# Patient Record
Sex: FEMALE | Race: WHITE | NOT HISPANIC OR LATINO | Employment: FULL TIME | URBAN - METROPOLITAN AREA
[De-identification: names, ages, dates, MRNs, and addresses within clinical notes are randomized per-mention and may not be internally consistent; named-entity substitution may affect disease eponyms.]

---

## 2017-01-09 ENCOUNTER — TELEPHONE (OUTPATIENT)
Dept: PHYSICAL MEDICINE AND REHAB | Facility: MEDICAL CENTER | Age: 52
End: 2017-01-09

## 2017-01-09 ENCOUNTER — OFFICE VISIT (OUTPATIENT)
Dept: PHYSICAL MEDICINE AND REHAB | Facility: MEDICAL CENTER | Age: 52
End: 2017-01-09
Payer: COMMERCIAL

## 2017-01-09 VITALS
BODY MASS INDEX: 24.59 KG/M2 | SYSTOLIC BLOOD PRESSURE: 122 MMHG | HEART RATE: 71 BPM | WEIGHT: 144 LBS | OXYGEN SATURATION: 95 % | TEMPERATURE: 99 F | HEIGHT: 64 IN | DIASTOLIC BLOOD PRESSURE: 68 MMHG

## 2017-01-09 DIAGNOSIS — M54.9 SPINAL PAIN: ICD-10-CM

## 2017-01-09 DIAGNOSIS — M51.35 DDD (DEGENERATIVE DISC DISEASE), THORACOLUMBAR: ICD-10-CM

## 2017-01-09 DIAGNOSIS — M54.9 MID BACK PAIN: ICD-10-CM

## 2017-01-09 DIAGNOSIS — M50.30 DDD (DEGENERATIVE DISC DISEASE), CERVICAL: ICD-10-CM

## 2017-01-09 DIAGNOSIS — M25.559 ARTHRALGIA OF HIP, UNSPECIFIED LATERALITY: ICD-10-CM

## 2017-01-09 DIAGNOSIS — M54.2 NECK PAIN: ICD-10-CM

## 2017-01-09 DIAGNOSIS — M54.12 CERVICAL RADICULITIS: ICD-10-CM

## 2017-01-09 DIAGNOSIS — M25.50 ARTHRALGIA, UNSPECIFIED JOINT: ICD-10-CM

## 2017-01-09 DIAGNOSIS — M54.50 CHRONIC MIDLINE LOW BACK PAIN WITHOUT SCIATICA: ICD-10-CM

## 2017-01-09 DIAGNOSIS — Z79.899 CONTROLLED SUBSTANCE AGREEMENT SIGNED: ICD-10-CM

## 2017-01-09 DIAGNOSIS — M79.18 MYOFASCIAL PAIN: ICD-10-CM

## 2017-01-09 DIAGNOSIS — G89.29 CHRONIC MIDLINE LOW BACK PAIN WITHOUT SCIATICA: ICD-10-CM

## 2017-01-09 DIAGNOSIS — M54.9 CHRONIC BILATERAL BACK PAIN, UNSPECIFIED BACK LOCATION: ICD-10-CM

## 2017-01-09 DIAGNOSIS — G89.29 CHRONIC BILATERAL BACK PAIN, UNSPECIFIED BACK LOCATION: ICD-10-CM

## 2017-01-09 DIAGNOSIS — M25.512 LEFT SHOULDER PAIN, UNSPECIFIED CHRONICITY: ICD-10-CM

## 2017-01-09 PROCEDURE — 20553 NJX 1/MLT TRIGGER POINTS 3/>: CPT | Performed by: PHYSICAL MEDICINE & REHABILITATION

## 2017-01-09 PROCEDURE — 99213 OFFICE O/P EST LOW 20 MIN: CPT | Mod: 25 | Performed by: PHYSICAL MEDICINE & REHABILITATION

## 2017-01-09 RX ORDER — OXYCODONE AND ACETAMINOPHEN 10; 325 MG/1; MG/1
TABLET ORAL
Qty: 90 TAB | Refills: 0 | Status: SHIPPED | OUTPATIENT
Start: 2017-01-09 | End: 2017-01-09 | Stop reason: SDUPTHER

## 2017-01-09 RX ORDER — OXYCODONE AND ACETAMINOPHEN 10; 325 MG/1; MG/1
TABLET ORAL
Qty: 90 TAB | Refills: 0 | Status: SHIPPED | OUTPATIENT
Start: 2017-01-09 | End: 2017-03-10 | Stop reason: SDUPTHER

## 2017-01-09 RX ORDER — RIFAXIMIN 550 MG/1
TABLET ORAL
COMMUNITY
Start: 2017-01-06 | End: 2020-02-13

## 2017-01-09 NOTE — MR AVS SNAPSHOT
"        Daylinlevon Taylor   2017 3:50 PM   Office Visit   MRN: [id number]    Department:  [facility]   Dept Phone:  [phone]    Description:  Female : 1965   Provider:  Eduardo High M.D.           Reason for Visit     Follow-Up           Allergies as of 2017     Allergen Noted Reactions    Codeine 2010   Itching, Vomiting    RXN=20 years go      You were diagnosed with     Controlled substance agreement signed   [771410]       Mid back pain   [506367]       Chronic midline low back pain without sciatica   [9446888]       Arthralgia, unspecified joint   [5661578]       DDD (degenerative disc disease), thoracolumbar   [256853]       Neck pain   [665994]       Cervical radiculitis   [106687]       DDD (degenerative disc disease), cervical   [599860]       Spinal pain   [149627]       Myofascial pain   [534556]         Vital Signs     Blood Pressure Pulse Temperature Height Weight Body Mass Index    122/68 mmHg 71 37.2 °C (99 °F) 1.626 m (5' 4.02\") 65.318 kg (144 lb) 24.71 kg/m2    Oxygen Saturation Last Menstrual Period Smoking Status             95% 2014 Never Smoker          Basic Information     Date Of Birth Sex Race Ethnicity Preferred Language    1965 Female White Non- English      Your appointments     Mar 10, 2017  1:30 PM   Follow Up Visit with Eduardo High M.D.   [facility] PHYSIATRY (--)    [address] [facility] [address]  [facility] [id number] [phone]           You will be receiving a confirmation call a few days before your appointment from our automated call confirmation system.              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      Below and/or attached are the medications your provider expects you to take. Review all of your home medications and newly ordered medications with your provider and/or pharmacist. Follow medication instructions as directed by your provider and/or pharmacist. Please keep your medication list with you and share with your provider. Update the information when medications are discontinued, doses are changed, or new medications (including over-the-counter products) are added; and carry medication information at all times in the event of emergency situations     Allergies:  CODEINE - Itching,Vomiting               Medications  Valid as of: January 09, 2017 -  5:00 PM    Generic Name Brand Name Tablet Size Instructions for use    Acyclovir (Tab) ZOVIRAX 400 MG         Albuterol Sulfate (Aero Soln) PROVENTIL  (90 BASE) MCG/ACT         Butalbital-APAP-Caffeine (Cap) FIORICET -40 MG         Diclofenac Sodium (Gel) Diclofenac Sodium 1 % APPLY GEL TOPICALLY 3-4 TIMES DAILY RO LEFT FOOT FOR PAIN        DiphenhydrAMINE HCl (Tab) BENADRYL 25 MG Take 25 mg by mouth 2 times a day as needed for Sleep.        Estradiol (Solution) EVAMIST 1.53 MG/SPRAY Apply 1 Spray to affected area(s) every day.        Fluocinonide (Cream) LIDEX 0.05 %         Fluticasone Propionate (Suspension) FLONASE 50 MCG/ACT Spray 1 Spray in nose every day.        Ibuprofen (Tab) MOTRIN 200 MG Take 200 mg by mouth 2 times a day as needed.        Levocetirizine Dihydrochloride (Tab) Levocetirizine Dihydrochloride 5 MG          Lidocaine (Patch) LIDODERM 5 % Apply 1 Patch to skin as directed every 24 hours. as needed for nerve pain        Naproxen (Tab) NAPROSYN 250 MG Take 250 mg by mouth 2 times a day, with meals.        Oxycodone-Acetaminophen (Tab) PERCOCET-10  MG Take 1/2 to 1 tablet by mouth every 8 hours as needed for pain.        Progesterone Micronized (Cap) PROMETRIUM 200 MG Take 200 mg by mouth every day.        RifAXIMin (Tab) XIFAXAN 550 MG         Sertraline HCl (Tab) ZOLOFT 100 MG Take 50 mg by mouth every day.        TraZODone HCl (Tab) DESYREL 50 MG Take 1/2 to 1 tablet by mouth at bedtime as needed for insomnia        .                 Medicines prescribed today were sent to:     [address] [address]    [address] [address]    Phone: [phone] Fax: [phone]    Open 24 Hours?: No      Medication refill instructions:       If your prescription bottle indicates you have medication refills left, it is not necessary to call your provider’s office. Please contact your pharmacy and they will refill your medication.    If your prescription bottle indicates you do not have any refills left, you may request refills at any time through one of the following ways: The online [employer] system (except Urgent Care), by calling your provider’s office, or by asking your pharmacy to contact your provider’s office with a refill request. Medication refills are processed only during regular business hours and may not be available until the next business day. Your provider may request additional information or to have a follow-up visit with you prior to refilling your medication.   *Please Note: Medication refills are assigned a new Rx number when refilled electronically. Your pharmacy may indicate that no refills were authorized even though a new prescription for the same medication is available at the pharmacy. Please request the medicine by name with the pharmacy  before contacting your provider for a refill.        Your To Do List     Future Labs/Procedures Complete By Expires    COMP METABOLIC PANEL  As directed 1/9/2018    MR-CERVICAL SPINE-W/O  As directed 1/9/2018         [employer] Access Code: [email or web address]  Expires: 1/30/2017  8:12 AM    [employer]  A secure, online tool to manage your health information     5 exampless [employer]® is a secure, online tool that connects you to your personalized health information from the privacy of your home -- day or night - making it very easy for you to manage your healthcare. Once the activation process is completed, you can even access your medical information using the [employer] alberto, which is available for free in the Apple Alberto store or Google Play store.     [employer] provides the following levels of access (as shown below):   My Chart Features   [facility]  Specialists [facility]  Urgent  Care Non-[facility]  Primary Care  Doctor   Email your healthcare team securely and privately 24/7 X X X    Manage appointments: schedule your next appointment; view details of past/upcoming appointments X      Request prescription refills. X      View recent personal medical records, including lab and immunizations X X X X   View health record, including health history, allergies, medications X X X X   Read reports about your outpatient visits, procedures, consult and ER notes X X X X   See your discharge summary, which is a recap of your hospital and/or ER visit that includes your diagnosis, lab results, and care plan. X X       How to register for [employer]:  1. Go to  [email or web address].  2. Click on the Sign Up Now box, which takes you to the New Member Sign Up page. You will need to provide the following information:  a. Enter your [employer] Access Code exactly as it appears at the top of this page. (You will not need to use this code after you’ve completed the sign-up process. If you do not sign up before the expiration  date, you must request a new code.)   b. Enter your date of birth.   c. Enter your home email address.   d. Click Submit, and follow the next screen’s instructions.  3. Create a ImmuRx ID. This will be your [employer] login ID and cannot be changed, so think of one that is secure and easy to remember.  4. Create a [employer]let password. You can change your password at any time.  5. Enter your Password Reset Question and Answer. This can be used at a later time if you forget your password.   6. Enter your e-mail address. This allows you to receive e-mail notifications when new information is available in ImmuRx.  7. Click Sign Up. You can now view your health information.    For assistance activating your ImmuRx account, call [phone]

## 2017-01-10 NOTE — PROGRESS NOTES
SUBJECTIVE:   Ms. Taylor returns to the office today for followup evaluation of spinal/joint/musculoskeletal pain.    The patient notes benefit with the trigger point injections at the last visit, unfortunately had flare pain, possibly activity associated.    She notes ongoing pain in the cervical region, primarily left-sided, now with radiating pain to the left upper limb, with neuropathic component.    She notes intermittent left shoulder area pain.    The patient notes mid back pain, primarily in the mid-lower thoracic region, also intermittent posterior chest wall radiation, neuropathic component, relatively controlled. She has had benefit with prior injection therapy.    She notes low back pain,  relatively controlled, without overt radicular component. She has had benefit with prior injection therapy.    She notes left hip/posterior pelvic area pain, relatively controlled.    She notes history of left foot/ankle area pain, seen by podiatrist.    The patient notes right small finger is resolved, had right small finger laceration outside the area 8/2016, returned to [facility], and right small finger radial digital nerve repair 8/17/2016, reviewed operative report      The patient has prior bilateral carpal tunnel release.    She notes intermittent headaches, relatively controlled.    She notes some difficulty with sleep.    She has had prior treatment including medications. She has been to physical therapy, currently in [facility]. She has tried acupuncture, chiropractics, and massage therapy, transiently palliative.   She has had prior injections, with benefit. She denies bowel/bladder dysfunction. She denies overt limb weakness. She denies cardiopulmonary symptomatology. She is making an effort with her home exercise program. The flare of pain is limiting her ability to function.      MEDICAL RECORDS REVIEW/DATA/REVIEW OF SYSTEMS:   Reviewed in epic.    I  reviewed medications. When used, the pain/symptomatic medications have been somewhat helpful for controlling her pain, maintaining mood, and allowing her to function, including ADLs. Notes dry mouth with tizanidine, otherwise side effects are controlled.  No aberrant behavior noted. Did not tolerate gabapentin. Note prior trial, not effective or tolerated: prozac, baclofen, robaxin, xanax.    I reviewed  profile 1/2017, consistent. Reviewed controlled substance agreement 3/2016.      I reviewed diagnostic studies.     Reviewed radiographs. Cervical spine x-rays 1/2014 showed mild degenerative changes, no instability.  Left shoulder x-rays 12/2016 showed  ac arthritis. MRI thoracic spine 10/2016 showed multilevel disc protrusions and degenerative changes, no intrinsic cord changes.  Reviewed MRI lumbar spine 4/2013, showed multilevel degenerative changes, disc protrusion and foraminal stenosis greatest at left L5-S1. Lumbar spine x-rays 4/2013 show degenerative changes, no instability. Left hip x-rays 2/2013 were unremarkable.  Reviewed chest CT 4/2016. Reviewed chest x-ray 3/2016.  Head CT 5/2014 was unremarkable. Reviewed pelvic ultrasound 1/2008.     Reviewed laboratory studies. Reviewed CMP and TFTs 1/2016. Reviewed CBC, BMP, UA 2/2016. A1C 3/2015 of 5.8.  Reviewed urine drug screen for 10/2016, consistent.    I reviewed medical issues. Followed by primary care provider. Dermatology consulted regarding skin lesions.  Followed by ENT, notes sinus disease. Followed by GI, including regarding IBS. The patient notes she is perimenopausal, care per gynecology.      Review of systems: No fevers or chills noted. No cranial nerve symptoms are denoted. See history, otherwise review of systems unremarkable.     Family history: No changes noted. No neuromuscular disorder is noted.    I reviewed social issues. Notes pending travel outside the area. 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resort, during  summer.      PAST MEDICAL HISTORY:   Past Medical History   Diagnosis Date   • Skin lesions    • Anxiety    • Depression    • Hypertension      with pain   • Asthma      once with an allergic reaction   • Headache    • Back pain        PAST SURGICAL HISTORY:    Past Surgical History   Procedure Laterality Date   • Knee reconstruction       Right ACL   • Carpal tunnel release       left   • Carpal tunnel release       right   • Nerve repair Right 8/17/2016     Procedure: NERVE REPAIR RADIAL DIGITAL SMALL FINGER;  Surgeon: Simone Mendoza M.D.;  Location: SURGERY [facility];  Service:        ALLERGIES:  Codeine    MEDICATIONS:    Outpatient Encounter Prescriptions as of 1/9/2017   Medication Sig Dispense Refill   • XIFAXAN 550 MG Tab tablet      • oxycodone-acetaminophen (PERCOCET-10)  MG Tab Take 1/2 to 1 tablet by mouth every 8 hours as needed for pain. 90 Tab 0   • [DISCONTINUED] oxycodone-acetaminophen (PERCOCET-10)  MG Tab Take 1/2 to 1 tablet by mouth every 8 hours as needed for pain. 90 Tab 0   • acyclovir (ZOVIRAX) 400 MG tablet      • Diclofenac Sodium 1 % Gel APPLY GEL TOPICALLY 3-4 TIMES DAILY RO LEFT FOOT FOR PAIN  0   • naproxen (NAPROSYN) 250 MG Tab Take 250 mg by mouth 2 times a day, with meals.     • [DISCONTINUED] Oxycodone-Acetaminophen (PERCOCET-10)  MG Tab Take 1/2 to 1 tablet by mouth every 8 hours as needed for pain. 90 Tab 0   • PROVENTIL  (90 BASE) MCG/ACT Aero Soln inhalation aerosol      • acetaminophen/caffeine/butalbital 300-40-50 mg (FIORICET) -40 MG Cap capsule      • fluocinonide (LIDEX) 0.05 % Cream      • Levocetirizine Dihydrochloride 5 MG Tab      • trazodone (DESYREL) 50 MG Tab Take 1/2 to 1 tablet by mouth at bedtime as needed for insomnia 30 Tab 2   • diphenhydrAMINE (BENADRYL) 25 MG Tab Take 25 mg by mouth 2 times a day as needed for Sleep.     • fluticasone (FLONASE) 50 MCG/ACT nasal spray Spray 1 Spray in nose every day.     • lidocaine  (LIDODERM) 5 % Patch Apply 1 Patch to skin as directed every 24 hours. as needed for nerve pain 30 Patch 5   • sertraline (ZOLOFT) 100 MG Tab Take 50 mg by mouth every day.     • EVAMIST 1.53 MG/SPRAY SOLN Apply 1 Spray to affected area(s) every day.     • progesterone (PROMETRIUM) 200 MG capsule Take 200 mg by mouth every day.     • ibuprofen (MOTRIN) 200 MG TABS Take 200 mg by mouth 2 times a day as needed.       No facility-administered encounter medications on file as of 1/9/2017.       SOCIAL HISTORY:    Social History     Social History   • Marital Status:      Spouse Name: N/A   • Number of Children: N/A   • Years of Education: N/A     Social History Main Topics   • Smoking status: Never Smoker    • Smokeless tobacco: Never Used   • Alcohol Use: No      Comment: prior occasional alcohol use, denies current use.   • Drug Use: No   • Sexual Activity: Not Asked     Other Topics Concern   • None     Social History Narrative     The patient denies substance use/abuse.     The patient denies psychiatric history                      OBJECTIVE:   Vital signs: reviewed  Constitutional:  awake alert, no acute distress  HEENT: Normocephalic atraumatic, neck supple, no JVD, no meningeal signs  Lymphatic: No cervical, supraclavicular, or inguinal lymphadenopathy noted  Pulmonary: No tachypnea noted. No accessory muscle use noted, no dyspnea noted  Abdomen: Soft, nontender, nondistended, no HSM, no peritoneal signs, no CVA tenderness  Musculoskeletal:    Inspection: no deformity noted, healed scars consistent with prior surgeries  Palpation: Tender with palpation left mid and lower cervical region, left interscapular region, mild tenderness with palpation about left shoulder Only mild tender with palpation in lumbosacral region, minimal tender left hip/posterior pelvic region, only mild tenderness with palpation in thoracic region,   Range of Motion: Decreased cervical spine range of motion, pain with testing, mild  pain with left shoulder range of motion testing, Only mild Decreased lumbar spine range of motion, only mild mild pain at the extremes of left hip range of motion testing, only mild decreased thoracic spine range of motion, only mild pain with cervical spine testing, minimal pain with shoulder testing   Strength/Tone: 5/5 in upper and lower limbs  Neurologic:               Oriented ×3              Cranial nerves grossly intact   Reflexes: 1-2+ in upper and lower limbs, no upper motor neuron signs evident   Nerve Tests: Spurlings testing produces axial pain on the left, straight leg testing negative, negative Tinel's in upper limbs   Coordination: gait steady, reciprocal   Sensation: grossly intact in upper and lower limbs  Cardiovascular: Distal pulses 2+, including at wrist and ankles, no limb swelling noted  Skin: no rashes or lesions noted    Procedure note: Written/informed consent was obtained, risks benefits and alternatives discussed, and all questions were answered. The patient was placed prone on the exam table and 3 trigger points were identified, one each in the upper, mid, and lower cervical paraspinal muscles. The areas were sterilely prepared. Then using a 25-gauge 1-1/2 inch needle, trigger point injections were performed with injection of 1 cc of a mixture of 1 cc of Depo-Medrol 40 mg/cc and 2 cc of 1% lidocaine at each site. The patient tolerated the procedure well. There were no complications. The patient was released to a family member who served as        ASSESSMENT:  1. Flare of neck pain, myofascial pain, cervical radiculitis, degenerative disc disease, suspect stenosis   2. Left shoulder area pain, sprain strain, ac arthritis, consider rotator cuff disorder   3. Back pain, myofascial pain, history of lumbar radiculitis (controlled), controlled, lumbar disc protrusion, degenerative disc disease, facet syndrome   4. Left hip area/ischial tuberosity area pain, myofascial pain, hamstring  strain, relatively controlled  5. Status post right carpal tunnel release 8/2014, remote left carpal release  6. Left foot/ankle pain, sprain strain, with care per podiatry  7. Insomnia  8. Controlled substance agreement signed  9. Comorbid medical issues, including IBS, skin lesions, with care per primary care provider and consultants      DISCUSSION/PLAN:  1. I reviewed post procedure precautions.   2. I discussed management options. I reviewed symptomatic care.  3. Ordered MRI cervical spine without contrast to evaluate for nerve root compromise or stenosis contributing to ongoing pain  4. Ordered updated CMP, including kidney/liver function  5. Continue with physical therapy. I discussed efforts with home exercise program and activity modification. Right-hand activity/restrictions per orthopedics  6. The patient can consider trials with acupuncture, massage therapy, or TENS unit  7. I reviewed sleep hygiene  8. I reviewed headaches/migraine triggers.  9. I reviewed medication monitoring. I advise the patient that pain medication dosing is in the moderate range per guidelines, reviewed risks and alternatives. For now, continue current medications. I reviewed medication adjustments, including opioid taper, patient to consider. I wrote a new prescription for Percocet 10/325 1/2 to 1 tablet by mouth every 8 hours as needed for pain. #90, refill zero, the earliest date the pharmacy may fill the prescription is 1/11/2017, 2 prescriptions written for 2 month supply.  I counseled the patient regarding risks, side effects, and interactions of medications, including NSAIDs and over-the-counter medications. I reviewed tylenol/acetaminophen dosing. I reviewed serotonin syndrome risk. I reviewed bowel management program. I recommend avoid use of benzodiazepines due to the risk of interaction with pain medication. I advise the patient to avoid alcohol use. I counseled the patient on the controlled substance prescribing  program. I reviewed further symptomatic medications.  10. I reviewed additional diagnostic options, including further/advanced imaging, electrodiagnostic testing, vascular studies, and further lab screen  11. I reviewed additional therapeutic options, including further injection therapy and additional consultative input  12. Return in 2 month, after the MRI, or an as-needed basis.      Please note that this dictation was created using voice recognition software. I have made every reasonable attempt to correct obvious errors but there may be errors of grammar and content that I may have overlooked prior to finalization of this note.

## 2017-01-31 ENCOUNTER — APPOINTMENT (OUTPATIENT)
Dept: RADIOLOGY | Facility: MEDICAL CENTER | Age: 52
End: 2017-01-31
Attending: PHYSICAL MEDICINE & REHABILITATION
Payer: COMMERCIAL

## 2017-01-31 DIAGNOSIS — M54.2 NECK PAIN: ICD-10-CM

## 2017-01-31 DIAGNOSIS — M54.12 CERVICAL RADICULITIS: ICD-10-CM

## 2017-01-31 DIAGNOSIS — M50.30 DDD (DEGENERATIVE DISC DISEASE), CERVICAL: ICD-10-CM

## 2017-01-31 PROCEDURE — 72141 MRI NECK SPINE W/O DYE: CPT

## 2017-02-14 ENCOUNTER — TELEPHONE (OUTPATIENT)
Dept: PHYSICAL MEDICINE AND REHAB | Facility: MEDICAL CENTER | Age: 52
End: 2017-02-14

## 2017-02-14 NOTE — TELEPHONE ENCOUNTER
You can fax reports to PT.     The MRI cervical spine showed narrowing at one level where the nerve comes out of the spine. This can be treated with physical therapy, activity modifications, and injections if needed - overall good news.      I let Daylin know above.

## 2017-02-14 NOTE — TELEPHONE ENCOUNTER
Daylin left message she would like to know the results of her recent imaging she had done. She also would like us to fax reports to [facility]. She has an appt with Dr. High 3/10/17 but said she would like to know results if she could.

## 2017-02-23 DIAGNOSIS — G47.00 INSOMNIA, UNSPECIFIED TYPE: ICD-10-CM

## 2017-02-23 DIAGNOSIS — G47.09 OTHER INSOMNIA: ICD-10-CM

## 2017-02-23 RX ORDER — TRAZODONE HYDROCHLORIDE 50 MG/1
TABLET ORAL
Qty: 30 TAB | Refills: 5 | Status: SHIPPED | OUTPATIENT
Start: 2017-02-23 | End: 2019-02-25

## 2017-03-10 ENCOUNTER — HOSPITAL ENCOUNTER (OUTPATIENT)
Dept: LAB | Facility: MEDICAL CENTER | Age: 52
End: 2017-03-10
Attending: PHYSICAL MEDICINE & REHABILITATION
Payer: COMMERCIAL

## 2017-03-10 ENCOUNTER — OFFICE VISIT (OUTPATIENT)
Dept: PHYSICAL MEDICINE AND REHAB | Facility: MEDICAL CENTER | Age: 52
End: 2017-03-10
Payer: COMMERCIAL

## 2017-03-10 VITALS
BODY MASS INDEX: 23.73 KG/M2 | HEIGHT: 64 IN | OXYGEN SATURATION: 98 % | DIASTOLIC BLOOD PRESSURE: 68 MMHG | SYSTOLIC BLOOD PRESSURE: 130 MMHG | WEIGHT: 139 LBS | HEART RATE: 83 BPM | TEMPERATURE: 98.1 F

## 2017-03-10 DIAGNOSIS — M51.35 DDD (DEGENERATIVE DISC DISEASE), THORACOLUMBAR: ICD-10-CM

## 2017-03-10 DIAGNOSIS — M25.50 ARTHRALGIA, UNSPECIFIED JOINT: ICD-10-CM

## 2017-03-10 DIAGNOSIS — G89.29 CHRONIC MIDLINE LOW BACK PAIN WITHOUT SCIATICA: ICD-10-CM

## 2017-03-10 DIAGNOSIS — M54.5 CHRONIC BILATERAL LOW BACK PAIN, WITH SCIATICA PRESENCE UNSPECIFIED: ICD-10-CM

## 2017-03-10 DIAGNOSIS — M79.18 MYOFASCIAL PAIN: ICD-10-CM

## 2017-03-10 DIAGNOSIS — G89.29 CHRONIC BILATERAL LOW BACK PAIN, WITH SCIATICA PRESENCE UNSPECIFIED: ICD-10-CM

## 2017-03-10 DIAGNOSIS — M54.50 CHRONIC MIDLINE LOW BACK PAIN WITHOUT SCIATICA: ICD-10-CM

## 2017-03-10 DIAGNOSIS — Z79.899 CONTROLLED SUBSTANCE AGREEMENT SIGNED: ICD-10-CM

## 2017-03-10 DIAGNOSIS — M54.9 MID BACK PAIN: ICD-10-CM

## 2017-03-10 DIAGNOSIS — M50.30 DDD (DEGENERATIVE DISC DISEASE), CERVICAL: ICD-10-CM

## 2017-03-10 DIAGNOSIS — M54.9 SPINAL PAIN: ICD-10-CM

## 2017-03-10 LAB
ALBUMIN SERPL BCP-MCNC: 3.7 G/DL (ref 3.2–4.9)
ALBUMIN/GLOB SERPL: 1.3 G/DL
ALP SERPL-CCNC: 61 U/L (ref 30–99)
ALT SERPL-CCNC: 19 U/L (ref 2–50)
ANION GAP SERPL CALC-SCNC: 6 MMOL/L (ref 0–11.9)
AST SERPL-CCNC: 17 U/L (ref 12–45)
BILIRUB SERPL-MCNC: 0.8 MG/DL (ref 0.1–1.5)
BUN SERPL-MCNC: 9 MG/DL (ref 8–22)
CALCIUM SERPL-MCNC: 9.2 MG/DL (ref 8.4–10.2)
CHLORIDE SERPL-SCNC: 103 MMOL/L (ref 96–112)
CO2 SERPL-SCNC: 27 MMOL/L (ref 20–33)
CREAT SERPL-MCNC: 0.7 MG/DL (ref 0.5–1.4)
GFR SERPL CREATININE-BSD FRML MDRD: >60 ML/MIN/1.73 M 2
GLOBULIN SER CALC-MCNC: 2.8 G/DL (ref 1.9–3.5)
GLUCOSE SERPL-MCNC: 92 MG/DL (ref 65–99)
POTASSIUM SERPL-SCNC: 4 MMOL/L (ref 3.6–5.5)
PROT SERPL-MCNC: 6.5 G/DL (ref 6–8.2)
SODIUM SERPL-SCNC: 136 MMOL/L (ref 135–145)

## 2017-03-10 PROCEDURE — 36415 COLL VENOUS BLD VENIPUNCTURE: CPT

## 2017-03-10 PROCEDURE — 80053 COMPREHEN METABOLIC PANEL: CPT

## 2017-03-10 PROCEDURE — 99214 OFFICE O/P EST MOD 30 MIN: CPT | Performed by: PHYSICAL MEDICINE & REHABILITATION

## 2017-03-10 RX ORDER — OXYCODONE AND ACETAMINOPHEN 10; 325 MG/1; MG/1
TABLET ORAL
Qty: 90 TAB | Refills: 0 | Status: SHIPPED | OUTPATIENT
Start: 2017-03-10 | End: 2017-03-10 | Stop reason: SDUPTHER

## 2017-03-10 RX ORDER — MELOXICAM 15 MG/1
TABLET ORAL
COMMUNITY
Start: 2017-02-08 | End: 2017-09-05

## 2017-03-10 RX ORDER — OXYCODONE AND ACETAMINOPHEN 10; 325 MG/1; MG/1
TABLET ORAL
Qty: 90 TAB | Refills: 0 | Status: SHIPPED | OUTPATIENT
Start: 2017-03-10 | End: 2017-04-17 | Stop reason: SDUPTHER

## 2017-03-10 RX ORDER — METHYLPREDNISOLONE 4 MG/1
TABLET ORAL
COMMUNITY
Start: 2017-03-05 | End: 2017-04-17

## 2017-03-10 NOTE — MR AVS SNAPSHOT
"Daylin Taylor   3/10/2017 1:30 PM   Office Visit   MRN: [id number]    Department:  [facility]   Dept Phone:  [phone]    Description:  Female : 1965   Provider:  Eduardo High M.D.           Reason for Visit     Follow-Up           Allergies as of 3/10/2017     Allergen Noted Reactions    Codeine 2010   Itching, Vomiting    RXN=20 years go      You were diagnosed with     Controlled substance agreement signed   [742098]       Mid back pain   [180488]       Chronic midline low back pain without sciatica   [9161879]       Arthralgia, unspecified joint   [3150255]       DDD (degenerative disc disease), thoracolumbar   [855740]         Vital Signs     Blood Pressure Pulse Temperature Height Weight Body Mass Index    130/68 mmHg 83 36.7 °C (98.1 °F) 1.626 m (5' 4\") 63.05 kg (139 lb) 23.85 kg/m2    Oxygen Saturation Last Menstrual Period Smoking Status             98% 2014 Never Smoker          Basic Information     Date Of Birth Sex Race Ethnicity Preferred Language    1965 Female White Non- English      Your appointments     2017  1:00 PM   Follow Up Visit with Eduardo High M.D.   [facility] (--)    [address] [id number] [phone]           You will be receiving a confirmation call a few days before your appointment from our automated call confirmation system.              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      Below and/or attached are the medications your provider expects you to take. Review all of your home medications and newly ordered medications with your provider and/or pharmacist. Follow medication instructions as directed by your provider and/or pharmacist. Please keep your medication list with you and share with your provider. Update the information when medications are discontinued, doses are changed, or new medications (including over-the-counter products) are added; and carry medication information at all times in the event of emergency situations     Allergies:  CODEINE - Itching,Vomiting               Medications  Valid as of: March 10, 2017 -  2:12 PM    Generic Name Brand Name Tablet Size Instructions for use    Acyclovir (Tab) ZOVIRAX 400 MG         Albuterol Sulfate (Aero Soln) PROVENTIL  (90 BASE) MCG/ACT         Butalbital-APAP-Caffeine (Cap) FIORICET -40 MG         Diclofenac Sodium (Gel) Diclofenac Sodium 1 % APPLY GEL TOPICALLY 3-4 TIMES DAILY RO LEFT FOOT FOR PAIN        DiphenhydrAMINE HCl (Tab) BENADRYL 25 MG Take 25 mg by mouth 2 times a day as needed for Sleep.        Estradiol (Solution) EVAMIST 1.53 MG/SPRAY Apply 1 Spray to affected area(s) every day.        Fluocinonide (Cream) LIDEX 0.05 %         Fluticasone Propionate (Suspension) FLONASE 50 MCG/ACT Spray 1 Spray in nose every day.        Ibuprofen (Tab) MOTRIN 200 MG Take 200 mg by mouth 2 times a day as needed.        Levocetirizine Dihydrochloride (Tab) Levocetirizine Dihydrochloride 5 MG         Lidocaine (Patch) LIDODERM 5 % Apply 1 Patch to skin as directed every 24 hours. as needed for nerve pain        Meloxicam (Tab) MOBIC 15 MG         MethylPREDNISolone (Tablet  Therapy Pack) MEDROL DOSEPAK 4 MG         Naproxen (Tab) NAPROSYN 250 MG Take 250 mg by mouth 2 times a day, with meals.        Oxycodone-Acetaminophen (Tab) PERCOCET-10  MG Take 1/2  to 1 tablet by mouth every 8 hours as needed for pain.        Progesterone Micronized (Cap) PROMETRIUM 200 MG Take 200 mg by mouth every day.        RifAXIMin (Tab) XIFAXAN 550 MG         Sertraline HCl (Tab) ZOLOFT 100 MG Take 50 mg by mouth every day.        TraZODone HCl (Tab) DESYREL 50 MG Take 1/2  to 1 tablet by mouth at bedtime as needed for insomnia        .                 Medicines prescribed today were sent to:     [facility] [address] [place] [address] - [facility] [place] [address] [address]    Phone: [phone] Fax: [phone]    Open 24 Hours?: No      Medication refill instructions:       If your prescription bottle indicates you have medication refills left, it is not necessary to call your provider’s office. Please contact your pharmacy and they will refill your medication.    If your prescription bottle indicates you do not have any refills left, you may request refills at any time through one of the following ways: The online [employer] system (except Urgent Care), by calling your provider’s office, or by asking your pharmacy to contact your provider’s office with a refill request. Medication refills are processed only during regular business hours and may not be available until the next business day. Your provider may request additional information or to have a follow-up visit with you prior to refilling your medication.   *Please Note: Medication refills are assigned a new Rx number when refilled electronically. Your pharmacy may indicate that no refills were authorized even though a new prescription for the same medication is available at the pharmacy. Please request the medicine by name with the pharmacy before contacting your provider for a refill.        Your To Do List      Future Labs/Procedures Complete By Expires    PAIN MANAGEMENT SCRN, W/ RFLX TO QNT  As directed 3/10/2018    Comments:    Current Meds (name, sig, last dose):   Current outpatient prescriptions:   •  trazodone, Take 1/2  to 1 tablet by mouth at bedtime as needed for insomnia, prn  •  oxycodone-acetaminophen, Take 1/2 to 1 tablet by mouth every 8 hours as needed for pain., 3/10/2017  •  sertraline, 50 mg, Oral, DAILY, 3/10/2017 at am  •  EVAMIST, 1 [place], Topical, DAILY, 3/10/2017 at am  •  progesterone, 200 mg, Oral, DAILY, 3/10/2017 at ran out  •  meloxicam, , prn  •  MethylPREDNISolone, , prn  •  XIFAXAN, , 1/9/2017  •  acyclovir, , prn  •  Diclofenac Sodium, APPLY GEL TOPICALLY 3-4 TIMES DAILY RO LEFT FOOT FOR PAIN, prn  •  naproxen, 250 mg, Oral, BID WITH MEALS, prn  •  PROVENTIL HFA, , prn  •  acetaminophen/caffeine/butalbital 300-40-50 mg, , prn  •  fluocinonide, , prn  •  Levocetirizine Dihydrochloride, , prn  •  diphenhydrAMINE, 25 mg, Oral, BID PRN, prn  •  fluticasone, 1 Spray, Nasal, DAILY, prn at am  •  lidocaine, 1 Patch, Transdermal, Q24HRS, prn  •  ibuprofen, 200 mg, Oral, BID PRN, prn at Unknown time          PAIN MANAGEMENT SCRN, W/ RFLX TO QNT  As directed 3/10/2018    Comments:    Current Meds (name, sig, last dose):   Current outpatient prescriptions:   •  oxycodone-acetaminophen, Take 1/2  to 1 tablet by mouth every 8 hours as needed for pain.  •  trazodone, Take 1/2  to 1 tablet by mouth at bedtime as needed for insomnia, prn  •  sertraline, 50 mg, Oral, DAILY, 3/10/2017 at am  •  EVAMIST, 1 [place], Topical, DAILY, 3/10/2017 at am  •  progesterone, 200 mg, Oral, DAILY, 3/10/2017 at ran out  •  meloxicam, , prn  •  MethylPREDNISolone, , prn  •  XIFAXAN, , 1/9/2017  •  acyclovir, , prn  •  Diclofenac Sodium, APPLY GEL TOPICALLY 3-4 TIMES DAILY RO LEFT FOOT FOR PAIN, prn  •  naproxen, 250 mg, Oral, BID WITH MEALS, prn  •  PROVENTIL HFA, , prn  •  acetaminophen/caffeine/butalbital 300-40-50 mg, ,  prn  •  fluocinonide, , prn  •  Levocetirizine Dihydrochloride, , prn  •  diphenhydrAMINE, 25 mg, Oral, BID PRN, prn  •  fluticasone, 1 Spray, Nasal, DAILY, prn at am  •  lidocaine, 1 Patch, Transdermal, Q24HRS, prn  •  ibuprofen, 200 mg, Oral, BID PRN, prn at Unknown time               Insight Guru Access Code: [email or web address]  Expires: 4/8/2017  2:25 PM    Insight Guru  A secure, online tool to manage your health information     [employer]’s Insight Guru® is a secure, online tool that connects you to your personalized health information from the privacy of your home -- day or night - making it very easy for you to manage your healthcare. Once the activation process is completed, you can even access your medical information using the Insight Guru alberto, which is available for free in the Apple Alberto store or Google Play store.     Insight Guru provides the following levels of access (as shown below):   My Chart Features   [facility] Doctor [facility]  Specialists [facility]  Urgent  Care [facility]  Primary Care  Doctor   Email your healthcare team securely and privately 24/7 X X X    Manage appointments: schedule your next appointment; view details of past/upcoming appointments X      Request prescription refills. X      View recent personal medical records, including lab and immunizations X X X X   View health record, including health history, allergies, medications X X X X   Read reports about your outpatient visits, procedures, consult and ER notes X X X X   See your discharge summary, which is a recap of your hospital and/or ER visit that includes your diagnosis, lab results, and care plan. X X       How to register for Insight Guru:  1. Go to  [email or web address].  2. Click on the Sign Up Now box, which takes you to the New Member Sign Up page. You will need to provide the following information:  a. Enter your Insight Guru Access Code exactly as it appears at the top of this page. (You will not need to use this code after you’ve  completed the sign-up process. If you do not sign up before the expiration date, you must request a new code.)   b. Enter your date of birth.   c. Enter your home email address.   d. Click Submit, and follow the next screen’s instructions.  3. Create a [employer] ID. This will be your [employer] login ID and cannot be changed, so think of one that is secure and easy to remember.  4. Create a [employer] password. You can change your password at any time.  5. Enter your Password Reset Question and Answer. This can be used at a later time if you forget your password.   6. Enter your e-mail address. This allows you to receive e-mail notifications when new information is available in [employer]digits.  7. Click Sign Up. You can now view your health information.    For assistance activating your [employer] account, call [phone]

## 2017-03-10 NOTE — PROGRESS NOTES
SUBJECTIVE:   Ms. Taylor returns to the office today for followup evaluation of spinal/joint/musculoskeletal pain.    The patient notes benefit with the trigger point injections at the last visit, although she has had flare pain, possibly activity associated.    She has been working with physical therapy, notes some benefit.    She notes ongoing pain in the cervical region, primarily left-sided, constant, now with only intermittent radiating pain to the left upper limb, with neuropathic component. She also notes left occipital area headaches. She notes the left axial neck pain is most functionally limiting.     The patient notes flare of mid back pain, primarily in the mid-lower thoracic region, also intermittent posterior chest wall radiation, neuropathic component. She has had benefit with prior injection therapy.    She notes intermittent left shoulder area pain, controlled.    She notes low back pain,  relatively controlled, without overt radicular component. She has had benefit with prior injection therapy.    She notes left hip area pain, controlled.    She notes left foot/ankle area pain, seen by podiatrist, notes trial with injection and oral steroids, orthotics/footwear.    The patient notes right small finger is resolved, had right small finger laceration outside the area 8/2016, returned to [place], and right small finger radial digital nerve repair 8/17/2016, reviewed operative report      The patient has prior bilateral carpal tunnel release.    She notes intermittent headaches, relatively controlled.    She notes intermittent difficulty with sleep.    She has had prior treatment including medications. She has been to physical therapy, currently in retrial. She has tried acupuncture, chiropractics, and massage therapy, transiently palliative.   She has had prior injections, with benefit. She denies bowel/bladder dysfunction. She denies overt limb  weakness. She denies cardiopulmonary symptomatology. She is making an effort with her home exercise program. The ongoing pain limits her ability to function.      MEDICAL RECORDS REVIEW/DATA/REVIEW OF SYSTEMS:   Reviewed in epic.    I reviewed medications. The pain/symptomatic medications have been somewhat helpful for controlling her pain, maintaining mood, and allowing her to function, including ADLs. Notes dry mouth with tizanidine, otherwise side effects are controlled.  No aberrant behavior noted. Did not tolerate gabapentin. Note prior trial, not effective or tolerated: prozac, baclofen, robaxin, xanax.    I reviewed  profile 3/10/2017, consistent. Reviewed controlled substance agreement 3/2016.      I reviewed diagnostic studies.     Reviewed radiographs. Reviewed MRI cervical spine, images and report, see below. Cervical spine x-rays 1/2014 showed mild degenerative changes, no instability.  Left shoulder x-rays 12/2016 showed  ac arthritis. MRI thoracic spine 10/2016 showed multilevel disc protrusions and degenerative changes, no intrinsic cord changes.  Reviewed MRI lumbar spine 4/2013, showed multilevel degenerative changes, disc protrusion and foraminal stenosis greatest at left L5-S1. Lumbar spine x-rays 4/2013 show degenerative changes, no instability. Left hip x-rays 2/2013 were unremarkable.  Reviewed chest CT 4/2016. Reviewed chest x-ray 3/2016.  Head CT 5/2014 was unremarkable. Reviewed pelvic ultrasound 1/2008.     Reviewed laboratory studies. Reviewed CMP 3/2017. Reviewed TFTs 1/2016. Reviewed CBC, BMP, UA 2/2016. A1C 3/2015 of 5.8.  Reviewed urine drug screen for 10/2016, consistent.    I reviewed medical issues. Followed by primary care provider. Dermatology consulted regarding skin lesions.  Followed by ENT, notes sinus disease. Followed by GI, including regarding IBS. The patient notes she is perimenopausal, care per gynecology.      Review of systems: No fevers or chills noted. No cranial  nerve symptoms are denoted. See history, otherwise review of systems unremarkable.     Family history: No changes noted. No neuromuscular disorder is noted.    I reviewed social issues. 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      1/31/2017 12:58 PM    HISTORY/REASON FOR EXAM:  Neck and left arm pain.  Left hand tingling 2-3 years.    TECHNIQUE/EXAM DESCRIPTION:  MRI of the cervical spine without contrast.    The study was performed on a [employer] Signa 1.5 Melina MRI scanner.  T1 sagittal, T2 fast spin-echo sagittal, and gradient echo axial images were obtained of the cervical spine. T2 fat suppressed sagittal images were also obtained. Optional T2 axial images may also be included.    COMPARISON: None.    FINDINGS:  Alignment in the cervical spine is normal. Marrow signal in the vertebral bodies is normal. The prevertebral and paraspinous soft tissues are unremarkable. There are no anomalies at the craniovertebral junction.  The cervical spinal cord is normal in   caliber and signal throughout its course.    At C2-3, no abnormality.    At C3-4, no abnormality.    At C4-5, there is no significant disc bulge or protrusion. There is moderate left-sided hypertrophic facet arthropathy. There is moderate left foraminal stenosis. The right neural foramen remains widely patent. There is no central stenosis or lateral   recess stenosis.    At C5-6, there is a minimal disc-osteophyte complex. This barely contacts the ventral subarachnoid space. There is no central or foraminal stenosis.    At C6-C7, there is a minimal disc-osteophyte complex. There is no central stenosis. There is no significant foraminal stenosis.    At C7-T1, no abnormality.         Impression        1.  C4-5 moderate left-sided hypertrophic facet arthropathy. Moderate left foraminal stenosis. This could be the source of left upper extremity radiculopathy.  2.  C5-6 minimal disc-osteophyte complex. No central or foraminal  stenosis.  3.  C6-C7 minimal disc-osteophyte complex. No central or foraminal stenosis.  4.  No myelopathic cord signal abnormality at any cervical level.       PAST MEDICAL HISTORY:   Past Medical History   Diagnosis Date   • Skin lesions    • Anxiety    • Depression    • Hypertension      with pain   • Asthma      once with an allergic reaction   • Headache    • Back pain        PAST SURGICAL HISTORY:    Past Surgical History   Procedure Laterality Date   • Knee reconstruction       Right ACL   • Carpal tunnel release       left   • Carpal tunnel release       right   • Nerve repair Right 8/17/2016     Procedure: NERVE REPAIR RADIAL DIGITAL SMALL FINGER;  Surgeon: Simone Mendoza M.D.;  Location: SURGERY [facility];  Service:        ALLERGIES:  Codeine    MEDICATIONS:    Outpatient Encounter Prescriptions as of 3/10/2017   Medication Sig Dispense Refill   • oxycodone-acetaminophen (PERCOCET-10)  MG Tab Take 1/2  to 1 tablet by mouth every 8 hours as needed for pain. 90 Tab 0   • trazodone (DESYREL) 50 MG Tab Take 1/2  to 1 tablet by mouth at bedtime as needed for insomnia 30 Tab 5   • sertraline (ZOLOFT) 100 MG Tab Take 50 mg by mouth every day.     • EVAMIST 1.53 MG/SPRAY SOLN Apply 1 Spray to affected area(s) every day.     • progesterone (PROMETRIUM) 200 MG capsule Take 200 mg by mouth every day.     • meloxicam (MOBIC) 15 MG tablet      • MethylPREDNISolone (MEDROL DOSEPAK) 4 MG Tablet Therapy Pack      • [DISCONTINUED] oxycodone-acetaminophen (PERCOCET-10)  MG Tab Take 1/2  to 1 tablet by mouth every 8 hours as needed for pain. 90 Tab 0   • [DISCONTINUED] oxycodone-acetaminophen (PERCOCET-10)  MG Tab Take 1/2  to 1 tablet by mouth every 8 hours as needed for pain. 90 Tab 0   • XIFAXAN 550 MG Tab tablet      • [DISCONTINUED] oxycodone-acetaminophen (PERCOCET-10)  MG Tab Take 1/2 to 1 tablet by mouth every 8 hours as needed for pain. 90 Tab 0   • acyclovir (ZOVIRAX) 400 MG  tablet      • Diclofenac Sodium 1 % Gel APPLY GEL TOPICALLY 3-4 TIMES DAILY RO LEFT FOOT FOR PAIN  0   • naproxen (NAPROSYN) 250 MG Tab Take 250 mg by mouth 2 times a day, with meals.     • PROVENTIL  (90 BASE) MCG/ACT Aero Soln inhalation aerosol      • acetaminophen/caffeine/butalbital 300-40-50 mg (FIORICET) -40 MG Cap capsule      • fluocinonide (LIDEX) 0.05 % Cream      • Levocetirizine Dihydrochloride 5 MG Tab      • diphenhydrAMINE (BENADRYL) 25 MG Tab Take 25 mg by mouth 2 times a day as needed for Sleep.     • fluticasone (FLONASE) 50 MCG/ACT nasal spray Spray 1 Spray in nose every day.     • lidocaine (LIDODERM) 5 % Patch Apply 1 Patch to skin as directed every 24 hours. as needed for nerve pain 30 Patch 5   • ibuprofen (MOTRIN) 200 MG TABS Take 200 mg by mouth 2 times a day as needed.       No facility-administered encounter medications on file as of 3/10/2017.       SOCIAL HISTORY:    Social History     Social History   • Marital Status:      Spouse Name: N/A   • Number of Children: N/A   • Years of Education: N/A     Social History Main Topics   • Smoking status: Never Smoker    • Smokeless tobacco: Never Used   • Alcohol Use: No   • Drug Use: No   • Sexual Activity: Not Asked     Other Topics Concern   • None     Social History Narrative     The patient denies substance use/abuse.     The patient denies psychiatric history                      OBJECTIVE:   Vital signs: reviewed  Constitutional:  awake alert, no acute distress  HEENT: Normocephalic atraumatic, neck supple, no JVD, no meningeal signs  Lymphatic: No cervical, supraclavicular, or inguinal lymphadenopathy noted  Pulmonary: No tachypnea noted. No accessory muscle use noted, no dyspnea noted  Abdomen: Soft, nontender, nondistended, no HSM, no peritoneal signs, no CVA tenderness  Musculoskeletal:    Inspection: no deformity noted, healed scars consistent with prior surgeries  Palpation: Tender with palpation left mid and  lower cervical region, tender left occipital region, only mild tenderness with palpation about left shoulder, tender with palpation mid thoracic region, trigger points noted, Only mild tender with palpation in lumbosacral region, minimal tender left hip/posterior pelvic region   Range of Motion: Decreased cervical spine range of motion, pain with extension to the left, reproducing pain, only mild pain with left shoulder range of motion testing, pain with thoracic spine range of motion testing, only mild decreased lumbar spine range of motion, only mild mild pain at the extremes of left hip range of motion testing   Strength/Tone: 5/5 in upper and lower limbs  Neurologic:               Oriented ×3              Cranial nerves grossly intact   Reflexes: 1-2+ in upper and lower limbs, no upper motor neuron signs evident   Nerve Tests: Spurlings testing produces axial pain on the left, straight leg testing negative, negative Tinel's in upper limbs   Coordination: gait steady, reciprocal   Sensation: grossly intact in upper and lower limbs  Cardiovascular: Distal pulses 2+, including at wrist and ankles, no limb swelling noted  Skin: no rashes or lesions noted       ASSESSMENT:  1. Neck pain, myofascial pain, cervical radiculitis (controlled), degenerative disc disease, foraminal stenosis, suspect cervical facet syndrome  2. Midback pain, myofascial pain, disc protrusion, degenerative disc disease, facet syndrome   3. Low back pain, myofascial pain, history of lumbar radiculitis (controlled), controlled, lumbar disc protrusion, degenerative disc disease, facet syndrome, relatively symptomatically controlled  4. Left shoulder area pain, sprain strain, ac arthritis, consider rotator cuff disorder, relatively controlled  5. Left hip area/ischial tuberosity area pain, myofascial pain, hamstring strain, controlled  6. Status post right carpal tunnel release 8/2014, remote left carpal release  7. Left foot/ankle pain, sprain  strain, with care per podiatry  8. Insomnia  9. Controlled substance agreement signed  10. Comorbid medical issues, including IBS, skin lesions, with care per primary care provider and consultants      DISCUSSION/PLAN:  1. I discussed management options. I reviewed symptomatic care.  2. Check results on urine drug screen, provided today  3. Reviewed injection therapy with left cervical 4, 5, 6, and 7 medial branch blocks, facet blocks, as diagnostic/therapeutic intervention for suspected facetogenic pain, as well as thoracic trigger point injections for treatment of the myofascial component to the ongoing pain, perform in procedure center  4. Continue with physical therapy. I discussed efforts with home exercise program and activity modification. Right-hand activity/restrictions per orthopedics  5. The patient can consider trials with acupuncture, massage therapy, or TENS unit  6. I reviewed sleep hygiene  7. I reviewed headaches/migraine triggers.  8. I reviewed medication monitoring. I advise the patient that pain medication dosing is in the moderate range per guidelines, reviewed risks and alternatives. For now, continue current medications. I reviewed medication adjustments, including opioid taper, patient to consider. I wrote a new prescription for Percocet 10/325 1/2 to 1 tablet by mouth every 8 hours as needed for pain. #90, refill zero, the earliest date the pharmacy may fill the prescription is 3/15/2017, 2 prescriptions written for 2 month supply.  I counseled the patient regarding risks, side effects, and interactions of medications, including NSAIDs and over-the-counter medications. I reviewed tylenol/acetaminophen dosing. I reviewed serotonin syndrome risk. I reviewed bowel management program. I recommend avoid use of benzodiazepines due to the risk of interaction with pain medication. I advise the patient to avoid alcohol use. I counseled the patient on the controlled substance prescribing program. I  reviewed further symptomatic medications.  9. I reviewed additional diagnostic options, including further/advanced imaging, electrodiagnostic testing, vascular studies, and further lab screen  10. I reviewed additional therapeutic options, including further injection therapy and additional consultative input  11. Return in 6 weeks or an as-needed basis      Please note that this dictation was created using voice recognition software. I have made every reasonable attempt to correct obvious errors but there may be errors of grammar and content that I may have overlooked prior to finalization of this note.

## 2017-03-11 NOTE — PROGRESS NOTES
Special procedures H&P:    SUBJECTIVE:   Ms. Taylor  notes ongoing pain in the cervical region, primarily left-sided, constant, now with only intermittent radiating pain to the left upper limb, with neuropathic component. She also notes left occipital area headaches. She notes the left axial neck pain is most functionally limiting. The patient notes ongoing mid back pain, primarily in the mid-lower thoracic region, also intermittent posterior chest wall radiation, neuropathic component. She has had benefit with prior injection therapy. She has had prior conservative treatment trial.    MEDICAL RECORDS REVIEW/DATA/REVIEW OF SYSTEMS:   Reviewed in Psychiatric.    I reviewed medications.      I reviewed diagnostic studies.     Reviewed radiographs. Reviewed MRI cervical spine 1/2017. Cervical spine x-rays 1/2014 showed mild degenerative changes, no instability.  Left shoulder x-rays 12/2016 showed  ac arthritis. MRI thoracic spine 10/2016 showed multilevel disc protrusions and degenerative changes, no intrinsic cord changes.      Reviewed laboratory studies. Reviewed CMP 3/2017. Reviewed TFTs 1/2016. Reviewed CBC, BMP, UA 2/2016. A1C 3/2015 of 5.8.  Reviewed urine drug screen for 10/2016, consistent.    I reviewed medical issues.        Review of systems: No fevers or chills noted. No cranial nerve symptoms are denoted. See history, otherwise review of systems unremarkable.     Family history: No changes noted. No neuromuscular disorder is noted.    I reviewed social issues. 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        1/31/2017 12:58 PM    HISTORY/REASON FOR EXAM:  Neck and left arm pain.  Left hand tingling 2-3 years.    TECHNIQUE/EXAM DESCRIPTION:  MRI of the cervical spine without contrast.    The study was performed on a [employer] 1.5 Melina MRI scanner.  T1 sagittal, T2 fast spin-echo sagittal, and gradient echo axial images were obtained of the cervical spine. T2 fat  suppressed sagittal images were also obtained. Optional T2 axial images may also be included.    COMPARISON: None.    FINDINGS:  Alignment in the cervical spine is normal. Marrow signal in the vertebral bodies is normal. The prevertebral and paraspinous soft tissues are unremarkable. There are no anomalies at the craniovertebral junction.  The cervical spinal cord is normal in   caliber and signal throughout its course.    At C2-3, no abnormality.    At C3-4, no abnormality.    At C4-5, there is no significant disc bulge or protrusion. There is moderate left-sided hypertrophic facet arthropathy. There is moderate left foraminal stenosis. The right neural foramen remains widely patent. There is no central stenosis or lateral   recess stenosis.    At C5-6, there is a minimal disc-osteophyte complex. This barely contacts the ventral subarachnoid space. There is no central or foraminal stenosis.    At C6-C7, there is a minimal disc-osteophyte complex. There is no central stenosis. There is no significant foraminal stenosis.    At C7-T1, no abnormality.           Impression          1.  C4-5 moderate left-sided hypertrophic facet arthropathy. Moderate left foraminal stenosis. This could be the source of left upper extremity radiculopathy.  2.  C5-6 minimal disc-osteophyte complex. No central or foraminal stenosis.  3.  C6-C7 minimal disc-osteophyte complex. No central or foraminal stenosis.  4.  No myelopathic cord signal abnormality at any cervical level.        PAST MEDICAL HISTORY:   Past Medical History    Diagnosis  Date    •  Skin lesions      •  Anxiety      •  Depression      •  Hypertension          with pain    •  Asthma          once with an allergic reaction    •  Headache      •  Back pain          PAST SURGICAL HISTORY:    Past Surgical History    Procedure  Laterality  Date    •  Knee reconstruction            Right ACL    •  Carpal tunnel release            left    •  Carpal tunnel release             right    •  Nerve repair  Right  8/17/2016        Procedure: NERVE REPAIR RADIAL DIGITAL SMALL FINGER;  Surgeon: Simone Mendoza M.D.;  Location: SURGERY [facility] [place];  Service:         ALLERGIES:  Codeine    MEDICATIONS:     Encounter Medications     Outpatient Encounter Prescriptions as of 3/10/2017    Medication  Sig  Dispense  Refill    •  oxycodone-acetaminophen (PERCOCET-10)  MG Tab  Take 1/2  to 1 tablet by mouth every 8 hours as needed for pain.  90 Tab  0    •  trazodone (DESYREL) 50 MG Tab  Take 1/2  to 1 tablet by mouth at bedtime as needed for insomnia  30 Tab  5    •  sertraline (ZOLOFT) 100 MG Tab  Take 50 mg by mouth every day.        •  EVAMIST 1.53 MG/SPRAY SOLN  Apply 1 Spray to affected area(s) every day.        •  progesterone (PROMETRIUM) 200 MG capsule  Take 200 mg by mouth every day.        •  meloxicam (MOBIC) 15 MG tablet          •  MethylPREDNISolone (MEDROL DOSEPAK) 4 MG Tablet Therapy Pack          •  [DISCONTINUED] oxycodone-acetaminophen (PERCOCET-10)  MG Tab  Take 1/2  to 1 tablet by mouth every 8 hours as needed for pain.  90 Tab  0    •  [DISCONTINUED] oxycodone-acetaminophen (PERCOCET-10)  MG Tab  Take 1/2  to 1 tablet by mouth every 8 hours as needed for pain.  90 Tab  0    •  XIFAXAN 550 MG Tab tablet          •  [DISCONTINUED] oxycodone-acetaminophen (PERCOCET-10)  MG Tab  Take 1/2 to 1 tablet by mouth every 8 hours as needed for pain.  90 Tab  0    •  acyclovir (ZOVIRAX) 400 MG tablet          •  Diclofenac Sodium 1 % Gel  APPLY GEL TOPICALLY 3-4 TIMES DAILY RO LEFT FOOT FOR PAIN    0    •  naproxen (NAPROSYN) 250 MG Tab  Take 250 mg by mouth 2 times a day, with meals.        •  PROVENTIL  (90 BASE) MCG/ACT Aero Soln inhalation aerosol          •  acetaminophen/caffeine/butalbital 300-40-50 mg (FIORICET) -40 MG Cap capsule          •  fluocinonide (LIDEX) 0.05 % Cream          •  Levocetirizine Dihydrochloride 5 MG Tab          •   diphenhydrAMINE (BENADRYL) 25 MG Tab  Take 25 mg by mouth 2 times a day as needed for Sleep.        •  fluticasone (FLONASE) 50 MCG/ACT nasal spray  Spray 1 Spray in nose every day.        •  lidocaine (LIDODERM) 5 % Patch  Apply 1 Patch to skin as directed every 24 hours. as needed for nerve pain  30 Patch  5    •  ibuprofen (MOTRIN) 200 MG TABS  Take 200 mg by mouth 2 times a day as needed.            No facility-administered encounter medications on file as of 3/10/2017.           SOCIAL HISTORY:     Social History     Social History        Social History    •  Marital Status:          Spouse Name:  N/A    •  Number of Children:  N/A    •  Years of Education:  N/A        Social History Main Topics    •  Smoking status:  Never Smoker     •  Smokeless tobacco:  Never Used    •  Alcohol Use:  No    •  Drug Use:  No    •  Sexual Activity:  Not Asked        Other Topics  Concern    •  None        Social History Narrative         The patient denies substance use/abuse.     The patient denies psychiatric history                       OBJECTIVE:   Vital signs: reviewed  Constitutional:  awake alert, no acute distress  HEENT: Normocephalic atraumatic, neck supple, no JVD, no meningeal signs  Lymphatic: No cervical, supraclavicular, or inguinal lymphadenopathy noted  Pulmonary: No tachypnea noted. No accessory muscle use noted, no dyspnea noted  Abdomen: Soft, nontender, nondistended, no HSM, no peritoneal signs, no CVA tenderness  Musculoskeletal:                Inspection: no deformity noted, healed scars consistent with prior surgeries  Palpation: Tender with palpation left mid and lower cervical region, tender left occipital region, only mild tenderness with palpation about left shoulder, tender with palpation mid thoracic region, trigger points noted, Only mild tender with palpation in lumbosacral region, minimal tender left hip/posterior pelvic region    Range of Motion: Decreased cervical spine range of  motion, pain with extension to the left, reproducing pain, only mild pain with left shoulder range of motion testing, pain with thoracic spine range of motion testing, only mild decreased lumbar spine range of motion, only mild mild pain at the extremes of left hip range of motion testing              Strength/Tone: 5/5 in upper and lower limbs  Neurologic:               Oriented ×3              Cranial nerves grossly intact              Reflexes: 1-2+ in upper and lower limbs, no upper motor neuron signs evident              Nerve Tests: Spurlings testing produces axial pain on the left, straight leg testing negative, negative Tinel's in upper limbs              Coordination: gait steady, reciprocal              Sensation: grossly intact in upper and lower limbs  Cardiovascular: Distal pulses 2+, including at wrist and ankles, no limb swelling noted  Skin: no rashes or lesions noted          Assessment:    1. Cervical degenerative disc disease  2. Myofascial pain, thoracic spine    Plan:    1. Left cervical 4, 5, 6, and 7 medial branch blocks, facet blocks  2. Trigger point injections, thoracic spine

## 2017-03-20 DIAGNOSIS — M79.18 MYOFASCIAL PAIN: ICD-10-CM

## 2017-03-20 DIAGNOSIS — M50.30 DDD (DEGENERATIVE DISC DISEASE), CERVICAL: ICD-10-CM

## 2017-03-20 NOTE — PROGRESS NOTES
Special procedures H&P:      SUBJECTIVE:   Ms. Taylor  notes ongoing pain in the cervical region, primarily left-sided, constant, now with only intermittent radiating pain to the left upper limb, with neuropathic component. She also notes left occipital area headaches. She notes the left axial neck pain is most functionally limiting. The patient notes ongoing mid back pain, primarily in the mid-lower thoracic region, also intermittent posterior chest wall radiation, neuropathic component. She has had benefit with prior injection therapy. She has had prior conservative treatment trial.    MEDICAL RECORDS REVIEW/DATA/REVIEW OF SYSTEMS:   Reviewed in [facility].    I reviewed medications.      I reviewed diagnostic studies.     Reviewed radiographs. Reviewed MRI cervical spine 1/2017. Cervical spine x-rays 1/2014 showed mild degenerative changes, no instability.  Left shoulder x-rays 12/2016 showed  ac arthritis. MRI thoracic spine 10/2016 showed multilevel disc protrusions and degenerative changes, no intrinsic cord changes.      Reviewed laboratory studies. Reviewed CMP 3/2017. Reviewed TFTs 1/2016. Reviewed CBC, BMP, UA 2/2016. A1C 3/2015 of 5.8.  Reviewed urine drug screen for 10/2016, consistent.    I reviewed medical issues.         Review of systems: No fevers or chills noted. No cranial nerve symptoms are denoted. See history, otherwise review of systems unremarkable.     Family history: No changes noted. No neuromuscular disorder is noted.    I reviewed social issues. 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        1/31/2017 12:58 PM    HISTORY/REASON FOR EXAM:  Neck and left arm pain.  Left hand tingling 2-3 years.    TECHNIQUE/EXAM DESCRIPTION:  MRI of the cervical spine without contrast.    The study was performed on a [employer] Signa 1.5 Melina MRI scanner.  T1 sagittal, T2 fast spin-echo sagittal, and gradient echo axial images were obtained of the cervical spine. T2 fat  suppressed sagittal images were also obtained. Optional T2 axial images may also be included.    COMPARISON: None.    FINDINGS:  Alignment in the cervical spine is normal. Marrow signal in the vertebral bodies is normal. The prevertebral and paraspinous soft tissues are unremarkable. There are no anomalies at the craniovertebral junction.  The cervical spinal cord is normal in   caliber and signal throughout its course.    At C2-3, no abnormality.    At C3-4, no abnormality.    At C4-5, there is no significant disc bulge or protrusion. There is moderate left-sided hypertrophic facet arthropathy. There is moderate left foraminal stenosis. The right neural foramen remains widely patent. There is no central stenosis or lateral   recess stenosis.    At C5-6, there is a minimal disc-osteophyte complex. This barely contacts the ventral subarachnoid space. There is no central or foraminal stenosis.    At C6-C7, there is a minimal disc-osteophyte complex. There is no central stenosis. There is no significant foraminal stenosis.    At C7-T1, no abnormality.             Impression            1.  C4-5 moderate left-sided hypertrophic facet arthropathy. Moderate left foraminal stenosis. This could be the source of left upper extremity radiculopathy.  2.  C5-6 minimal disc-osteophyte complex. No central or foraminal stenosis.  3.  C6-C7 minimal disc-osteophyte complex. No central or foraminal stenosis.  4.  No myelopathic cord signal abnormality at any cervical level.         PAST MEDICAL HISTORY:   Past Medical History     Diagnosis   Date     •   Skin lesions        •   Anxiety        •   Depression        •   Hypertension              with pain     •   Asthma              once with an allergic reaction     •   Headache        •   Back pain            PAST SURGICAL HISTORY:    Past Surgical History     Procedure   Laterality   Date     •   Knee reconstruction                 Right ACL     •   Carpal tunnel release                  left     •   Carpal tunnel release                 right     •   Nerve repair   Right   8/17/2016           Procedure: NERVE REPAIR RADIAL DIGITAL SMALL FINGER;  Surgeon: Simone Mendoza M.D.;  Location: SURGERY [facility];  Service:          ALLERGIES:  Codeine    MEDICATIONS:      Encounter Medications      Outpatient Encounter Prescriptions as of 3/10/2017     Medication   Sig   Dispense   Refill     •   oxycodone-acetaminophen (PERCOCET-10)  MG Tab   Take 1/2  to 1 tablet by mouth every 8 hours as needed for pain.   90 Tab   0     •   trazodone (DESYREL) 50 MG Tab   Take 1/2  to 1 tablet by mouth at bedtime as needed for insomnia   30 Tab   5     •   sertraline (ZOLOFT) 100 MG Tab   Take 50 mg by mouth every day.           •   EVAMIST 1.53 MG/SPRAY SOLN   Apply 1 Spray to affected area(s) every day.           •   progesterone (PROMETRIUM) 200 MG capsule   Take 200 mg by mouth every day.           •   meloxicam (MOBIC) 15 MG tablet              •   MethylPREDNISolone (MEDROL DOSEPAK) 4 MG Tablet Therapy Pack              •   [DISCONTINUED] oxycodone-acetaminophen (PERCOCET-10)  MG Tab   Take 1/2  to 1 tablet by mouth every 8 hours as needed for pain.   90 Tab   0     •   [DISCONTINUED] oxycodone-acetaminophen (PERCOCET-10)  MG Tab   Take 1/2  to 1 tablet by mouth every 8 hours as needed for pain.   90 Tab   0     •   XIFAXAN 550 MG Tab tablet              •   [DISCONTINUED] oxycodone-acetaminophen (PERCOCET-10)  MG Tab   Take 1/2 to 1 tablet by mouth every 8 hours as needed for pain.   90 Tab   0     •   acyclovir (ZOVIRAX) 400 MG tablet              •   Diclofenac Sodium 1 % Gel   APPLY GEL TOPICALLY 3-4 TIMES DAILY RO LEFT FOOT FOR PAIN      0     •   naproxen (NAPROSYN) 250 MG Tab   Take 250 mg by mouth 2 times a day, with meals.           •   PROVENTIL  (90 BASE) MCG/ACT Aero Soln inhalation aerosol              •   acetaminophen/caffeine/butalbital 300-40-50 mg  (FIORICET) -40 MG Cap capsule              •   fluocinonide (LIDEX) 0.05 % Cream              •   Levocetirizine Dihydrochloride 5 MG Tab              •   diphenhydrAMINE (BENADRYL) 25 MG Tab   Take 25 mg by mouth 2 times a day as needed for Sleep.           •   fluticasone (FLONASE) 50 MCG/ACT nasal spray   Spray 1 Spray in nose every day.           •   lidocaine (LIDODERM) 5 % Patch   Apply 1 Patch to skin as directed every 24 hours. as needed for nerve pain   30 Patch   5     •   ibuprofen (MOTRIN) 200 MG TABS   Take 200 mg by mouth 2 times a day as needed.               No facility-administered encounter medications on file as of 3/10/2017.             SOCIAL HISTORY:      Social History      Social History         Social History     •   Marital Status:              Spouse Name:   N/A     •   Number of Children:   N/A     •   Years of Education:   N/A         Social History Main Topics     •   Smoking status:   Never Smoker      •   Smokeless tobacco:   Never Used     •   Alcohol Use:   No     •   Drug Use:   No     •   Sexual Activity:   Not Asked         Other Topics   Concern     •   None         Social History Narrative           The patient denies substance use/abuse.     The patient denies psychiatric history                       OBJECTIVE:    Vital signs: reviewed  Constitutional:  awake alert, no acute distress  HEENT: Normocephalic atraumatic, neck supple, no JVD, no meningeal signs  Lymphatic: No cervical, supraclavicular, or inguinal lymphadenopathy noted  Pulmonary: No tachypnea noted. No accessory muscle use noted, no dyspnea noted  Abdomen: Soft, nontender, nondistended, no HSM, no peritoneal signs, no CVA tenderness  Musculoskeletal:                Inspection: no deformity noted, healed scars consistent with prior surgeries  Palpation: Tender with palpation left mid and lower cervical region, tender left occipital region, only mild tenderness with palpation about left shoulder,  tender with palpation mid thoracic region, trigger points noted, Only mild tender with palpation in lumbosacral region, minimal tender left hip/posterior pelvic region    Range of Motion: Decreased cervical spine range of motion, pain with extension to the left, reproducing pain, only mild pain with left shoulder range of motion testing, pain with thoracic spine range of motion testing, only mild decreased lumbar spine range of motion, only mild mild pain at the extremes of left hip range of motion testing              Strength/Tone: 5/5 in upper and lower limbs  Neurologic:                Oriented ×3              Cranial nerves grossly intact              Reflexes: 1-2+ in upper and lower limbs, no upper motor neuron signs evident              Nerve Tests: Spurlings testing produces axial pain on the left, straight leg testing negative, negative Tinel's in upper limbs              Coordination: gait steady, reciprocal              Sensation: grossly intact in upper and lower limbs  Cardiovascular: Distal pulses 2+, including at wrist and ankles, no limb swelling noted  Skin: no rashes or lesions noted            Assessment:    1. Cervical degenerative disc disease  2. Myofascial pain, thoracic spine    Plan:    1. Left cervical 4, 5, 6, and 7 medial branch blocks, facet blocks  2. Trigger point injections, thoracic spine

## 2017-03-22 ENCOUNTER — HOSPITAL ENCOUNTER (OUTPATIENT)
Dept: LAB | Facility: MEDICAL CENTER | Age: 52
End: 2017-03-22
Attending: PODIATRIST
Payer: COMMERCIAL

## 2017-03-22 LAB
BASOPHILS # BLD AUTO: 0.04 K/UL (ref 0–0.12)
BASOPHILS NFR BLD AUTO: 0.7 % (ref 0–1.8)
CRP SERPL HS-MCNC: 0.18 MG/DL (ref 0–0.75)
EOSINOPHIL # BLD: 0.08 K/UL (ref 0–0.51)
EOSINOPHIL NFR BLD AUTO: 1.4 % (ref 0–6.9)
ERYTHROCYTE [DISTWIDTH] IN BLOOD BY AUTOMATED COUNT: 44.5 FL (ref 35.9–50)
ERYTHROCYTE [SEDIMENTATION RATE] IN BLOOD BY WESTERGREN METHOD: 12 MM/HOUR (ref 0–30)
HCT VFR BLD AUTO: 40.8 % (ref 37–47)
HGB BLD-MCNC: 13.9 G/DL (ref 12–16)
IMM GRANULOCYTES # BLD AUTO: 0 K/UL (ref 0–0.11)
IMM GRANULOCYTES NFR BLD AUTO: 0 % (ref 0–0.9)
LYMPHOCYTES # BLD: 1.48 K/UL (ref 1–4.8)
LYMPHOCYTES NFR BLD AUTO: 26.2 % (ref 22–41)
MCH RBC QN AUTO: 31.2 PG (ref 27–33)
MCHC RBC AUTO-ENTMCNC: 34.1 G/DL (ref 33.6–35)
MCV RBC AUTO: 91.5 FL (ref 81.4–97.8)
MONOCYTES # BLD: 0.46 K/UL (ref 0–0.85)
MONOCYTES NFR BLD AUTO: 8.1 % (ref 0–13.4)
NEUTROPHILS # BLD: 3.59 K/UL (ref 2–7.15)
NEUTROPHILS NFR BLD AUTO: 63.6 % (ref 44–72)
NRBC # BLD AUTO: 0 K/UL
NRBC BLD-RTO: 0 /100 WBC
PLATELET # BLD AUTO: 291 K/UL (ref 164–446)
PMV BLD AUTO: 9.8 FL (ref 9–12.9)
RBC # BLD AUTO: 4.46 M/UL (ref 4.2–5.4)
RHEUMATOID FACT SERPL-ACNC: <10 IU/ML (ref 0–14)
URATE SERPL-MCNC: 3.5 MG/DL (ref 1.9–8.2)
WBC # BLD AUTO: 5.7 K/UL (ref 4.8–10.8)

## 2017-03-22 PROCEDURE — 84550 ASSAY OF BLOOD/URIC ACID: CPT

## 2017-03-22 PROCEDURE — 85025 COMPLETE CBC W/AUTO DIFF WBC: CPT

## 2017-03-22 PROCEDURE — 36415 COLL VENOUS BLD VENIPUNCTURE: CPT

## 2017-03-22 PROCEDURE — 86038 ANTINUCLEAR ANTIBODIES: CPT

## 2017-03-22 PROCEDURE — 85652 RBC SED RATE AUTOMATED: CPT

## 2017-03-22 PROCEDURE — 86140 C-REACTIVE PROTEIN: CPT

## 2017-03-22 PROCEDURE — 86431 RHEUMATOID FACTOR QUANT: CPT

## 2017-03-24 LAB — NUCLEAR IGG SER QL IA: NORMAL

## 2017-03-28 ENCOUNTER — HOSPITAL ENCOUNTER (OUTPATIENT)
Dept: RADIOLOGY | Facility: REHABILITATION | Age: 52
End: 2017-03-28
Attending: PHYSICAL MEDICINE & REHABILITATION
Payer: COMMERCIAL

## 2017-03-28 ENCOUNTER — HOSPITAL ENCOUNTER (OUTPATIENT)
Dept: PAIN MANAGEMENT | Facility: REHABILITATION | Age: 52
End: 2017-03-28
Attending: PHYSICAL MEDICINE & REHABILITATION
Payer: COMMERCIAL

## 2017-03-28 VITALS
HEIGHT: 64 IN | SYSTOLIC BLOOD PRESSURE: 153 MMHG | BODY MASS INDEX: 24.73 KG/M2 | RESPIRATION RATE: 18 BRPM | HEART RATE: 68 BPM | OXYGEN SATURATION: 93 % | WEIGHT: 144.84 LBS | DIASTOLIC BLOOD PRESSURE: 90 MMHG | TEMPERATURE: 99.4 F

## 2017-03-28 PROCEDURE — 20552 NJX 1/MLT TRIGGER POINT 1/2: CPT

## 2017-03-28 PROCEDURE — 20553 NJX 1/MLT TRIGGER POINTS 3/>: CPT

## 2017-03-28 PROCEDURE — 700117 HCHG RX CONTRAST REV CODE 255

## 2017-03-28 PROCEDURE — 64491 INJ PARAVERT F JNT C/T 2 LEV: CPT

## 2017-03-28 PROCEDURE — 99152 MOD SED SAME PHYS/QHP 5/>YRS: CPT

## 2017-03-28 PROCEDURE — 700111 HCHG RX REV CODE 636 W/ 250 OVERRIDE (IP)

## 2017-03-28 PROCEDURE — 64492 INJ PARAVERT F JNT C/T 3 LEV: CPT

## 2017-03-28 PROCEDURE — 64490 INJ PARAVERT F JNT C/T 1 LEV: CPT

## 2017-03-28 RX ORDER — DEXAMETHASONE SODIUM PHOSPHATE 10 MG/ML
INJECTION, SOLUTION INTRAMUSCULAR; INTRAVENOUS
Status: COMPLETED
Start: 2017-03-28 | End: 2017-03-28

## 2017-03-28 RX ORDER — TRIAMCINOLONE ACETONIDE 40 MG/ML
INJECTION, SUSPENSION INTRA-ARTICULAR; INTRAMUSCULAR
Status: COMPLETED
Start: 2017-03-28 | End: 2017-03-28

## 2017-03-28 RX ORDER — MIDAZOLAM HYDROCHLORIDE 1 MG/ML
INJECTION INTRAMUSCULAR; INTRAVENOUS
Status: COMPLETED
Start: 2017-03-28 | End: 2017-03-28

## 2017-03-28 RX ORDER — BUPIVACAINE HYDROCHLORIDE 2.5 MG/ML
INJECTION, SOLUTION EPIDURAL; INFILTRATION; INTRACAUDAL
Status: COMPLETED
Start: 2017-03-28 | End: 2017-03-28

## 2017-03-28 RX ORDER — LIDOCAINE HYDROCHLORIDE 10 MG/ML
INJECTION, SOLUTION EPIDURAL; INFILTRATION; INTRACAUDAL; PERINEURAL
Status: COMPLETED
Start: 2017-03-28 | End: 2017-03-28

## 2017-03-28 RX ADMIN — MIDAZOLAM HYDROCHLORIDE 1 MG: 1 INJECTION, SOLUTION INTRAMUSCULAR; INTRAVENOUS at 15:35

## 2017-03-28 RX ADMIN — IOHEXOL 2 ML: 240 INJECTION, SOLUTION INTRATHECAL; INTRAVASCULAR; INTRAVENOUS; ORAL at 15:47

## 2017-03-28 RX ADMIN — LIDOCAINE HYDROCHLORIDE 15 ML: 10 INJECTION, SOLUTION EPIDURAL; INFILTRATION; INTRACAUDAL; PERINEURAL at 15:43

## 2017-03-28 RX ADMIN — FENTANYL CITRATE 25 MCG: 50 INJECTION, SOLUTION INTRAMUSCULAR; INTRAVENOUS at 15:35

## 2017-03-28 RX ADMIN — TRIAMCINOLONE ACETONIDE 40 MG: 40 INJECTION, SUSPENSION INTRA-ARTICULAR; INTRAMUSCULAR at 15:47

## 2017-03-28 RX ADMIN — DEXAMETHASONE SODIUM PHOSPHATE 10 MG: 10 INJECTION, SOLUTION INTRAMUSCULAR; INTRAVENOUS at 15:49

## 2017-03-28 ASSESSMENT — PAIN SCALES - GENERAL
PAINLEVEL_OUTOF10: 1
PAINLEVEL_OUTOF10: 1
PAINLEVEL_OUTOF10: 4

## 2017-03-28 NOTE — PROGRESS NOTES
Current medications reviewed with pt, see medications reconciliation form. Pt sravani taking ASA or other blood thinners or anti-inflammatories.  Pt has a ride post-procedure(Chris to drive).  Printed and verbal discharge instructions given to pt who verbalized understanding.

## 2017-03-29 ENCOUNTER — TELEPHONE (OUTPATIENT)
Dept: PHYSICAL MEDICINE AND REHAB | Facility: MEDICAL CENTER | Age: 52
End: 2017-03-29

## 2017-03-29 NOTE — PROCEDURES
DATE OF SERVICE:  03/28/2017    DIAGNOSES:  1.  Spondylosis without myelopathy or radiculopathy, cervical region.  2.  Myofascial pain, thoracic spine.    PROCEDURE:  1.  Left cervical 4, 5, 6, and 7 medial branch blocks (facet blocks).  2.  Trigger point injections, thoracic spine.    PROCEDURE NOTE:  Informed consent was obtained.  Risks, benefits all discussed   and all questions answered.  An angiocatheter was placed in the upper limb.    patient was placed prone on the fluoroscopy table the skin area from the   occiput to T4 was prepared in a sterile manner with povidone-iodine.    Intravenous conscious sedation using 1 mg of midazolam and 25 mcg of fentanyl   was administered to the patient.  The patient was monitored throughout the   procedure.    Using fluoroscopy with the patient in prone position, the concavities of the   left cervical 4, 5, 6, and 7 were identified.  Those areas were then locally   anesthetized with 2 mL of 1% preservative-free lidocaine.    Using fluoroscopy, 25-gauge spinal needles were inserted and advanced   concavities of the cervical 4, 5, 6, and 7 vertebrae, confirmed on multiplanar   imaging.  Next, 0.5 mL of Iohexol contrast was injected at each site to   ensure nonvascular flow prior to injection.  Then, following negative   aspiration, 1 mL of a mixture of 1 mL of Kenalog 40 mg/mL and 3 mL of 1%   preservative-free lidocaine was injected at each site.  A total of 4 spinal   needles were used for the medial branch blocks.  There were no complications.    Following recovery, trigger points were identified, 2, a total in the right   lower thoracic paraspinal muscles.  There areas were marked and sterile prepped.    Then, using a 27-gauge 1-1/2 inch needle, trigger point injections were performed   with injection of 1 mL of a mixture of 1 mL of dexamethasone 10 mg/mL and 1 mL of   1% lidocaine.  The patient tolerated the procedure well.  There were no complications.    The patient  was transported to recovery.  The patient noted decreased pain   postprocedure.  Postinjection instructions were reviewed with the patient.    The patient was discharged home in the care of a friend and/or family member.       ____________________________________     MD BARTOLOME TUCKER / STANLEY    DD:  03/28/2017 16:31:39  DT:  03/28/2017 22:00:43    D#:  [id number]  Job#:  [id number]

## 2017-03-31 NOTE — TELEPHONE ENCOUNTER
Please obtain records from patient's podiatry for  [id number].     You can ask patient who her podiatrist is.      I left message to call me back with Podiatrist information.

## 2017-04-17 ENCOUNTER — OFFICE VISIT (OUTPATIENT)
Dept: PHYSICAL MEDICINE AND REHAB | Facility: MEDICAL CENTER | Age: 52
End: 2017-04-17
Payer: COMMERCIAL

## 2017-04-17 VITALS
TEMPERATURE: 98.6 F | SYSTOLIC BLOOD PRESSURE: 120 MMHG | WEIGHT: 141 LBS | BODY MASS INDEX: 24.07 KG/M2 | DIASTOLIC BLOOD PRESSURE: 80 MMHG | OXYGEN SATURATION: 97 % | HEART RATE: 72 BPM | HEIGHT: 64 IN

## 2017-04-17 DIAGNOSIS — M50.30 DDD (DEGENERATIVE DISC DISEASE), CERVICAL: ICD-10-CM

## 2017-04-17 DIAGNOSIS — M54.50 CHRONIC MIDLINE LOW BACK PAIN WITHOUT SCIATICA: ICD-10-CM

## 2017-04-17 DIAGNOSIS — M51.35 DDD (DEGENERATIVE DISC DISEASE), THORACOLUMBAR: ICD-10-CM

## 2017-04-17 DIAGNOSIS — M25.50 ARTHRALGIA, UNSPECIFIED JOINT: ICD-10-CM

## 2017-04-17 DIAGNOSIS — G89.29 CHRONIC MIDLINE LOW BACK PAIN WITHOUT SCIATICA: ICD-10-CM

## 2017-04-17 DIAGNOSIS — M79.672 LEFT FOOT PAIN: ICD-10-CM

## 2017-04-17 DIAGNOSIS — M54.9 MID BACK PAIN: ICD-10-CM

## 2017-04-17 DIAGNOSIS — M54.2 NECK PAIN: ICD-10-CM

## 2017-04-17 DIAGNOSIS — M79.18 MYOFASCIAL PAIN: ICD-10-CM

## 2017-04-17 DIAGNOSIS — Z79.899 CONTROLLED SUBSTANCE AGREEMENT SIGNED: ICD-10-CM

## 2017-04-17 PROCEDURE — 99214 OFFICE O/P EST MOD 30 MIN: CPT | Performed by: PHYSICAL MEDICINE & REHABILITATION

## 2017-04-17 RX ORDER — OXYCODONE AND ACETAMINOPHEN 10; 325 MG/1; MG/1
TABLET ORAL
Qty: 180 TAB | Refills: 0 | Status: SHIPPED | OUTPATIENT
Start: 2017-04-17 | End: 2017-09-05 | Stop reason: SDUPTHER

## 2017-04-17 NOTE — MR AVS SNAPSHOT
"        Daylin Katz Claudia   2017 1:00 PM   Office Visit   MRN: [id number]    Department:  [facility]   Dept Phone:  [phone]    Description:  Female : 1965   Provider:  Eduardo High M.D.           Reason for Visit     Follow-Up           Allergies as of 2017     Allergen Noted Reactions    Codeine 2010   Itching, Vomiting    RXN=20 years go      You were diagnosed with     Controlled substance agreement signed   [533762]       Mid back pain   [240311]       Chronic midline low back pain without sciatica   [2548746]       Arthralgia, unspecified joint   [5204569]       DDD (degenerative disc disease), thoracolumbar   [061019]         Vital Signs     Blood Pressure Pulse Temperature Height Weight Body Mass Index    120/80 mmHg 72 37 °C (98.6 °F) 1.626 m (5' 4.02\") 63.957 kg (141 lb) 24.19 kg/m2    Oxygen Saturation Last Menstrual Period Smoking Status             97% 2014 Never Smoker          Basic Information     Date Of Birth Sex Race Ethnicity Preferred Language    1965 Female White Non- English      Problem List              ICD-10-CM Priority Class Noted - Resolved    Low back pain M54.5   2013 - Present    Mid back pain M54.9   2013 - Present    Hip pain M25.559   2013 - Present    Neck pain M54.2   2013 - Present    Myofascial pain M79.1   10/9/2015 - Present    Controlled substance agreement signed Z79.899   2016 - Present    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      Below and/or attached are the medications your provider expects you to take. Review all of your home medications and " newly ordered medications with your provider and/or pharmacist. Follow medication instructions as directed by your provider and/or pharmacist. Please keep your medication list with you and share with your provider. Update the information when medications are discontinued, doses are changed, or new medications (including over-the-counter products) are added; and carry medication information at all times in the event of emergency situations     Allergies:  CODEINE - Itching,Vomiting               Medications  Valid as of: April 17, 2017 -  1:44 PM    Generic Name Brand Name Tablet Size Instructions for use    Acyclovir (Tab) ZOVIRAX 400 MG         Albuterol Sulfate (Aero Soln) PROVENTIL  (90 BASE) MCG/ACT         Butalbital-APAP-Caffeine (Cap) FIORICET -40 MG         Diclofenac Sodium (Gel) Diclofenac Sodium 1 % APPLY GEL TOPICALLY 3-4 TIMES DAILY RO LEFT FOOT FOR PAIN        DiphenhydrAMINE HCl (Tab) BENADRYL 25 MG Take 25 mg by mouth 2 times a day as needed for Sleep.        Estradiol (Solution) EVAMIST 1.53 MG/SPRAY Apply 1 Spray to affected area(s) every day.        Fluocinonide (Cream) LIDEX 0.05 %         Fluticasone Propionate (Suspension) FLONASE 50 MCG/ACT Spray 1 Spray in nose every day.        Ibuprofen (Tab) MOTRIN 200 MG Take 200 mg by mouth 2 times a day as needed.        Levocetirizine Dihydrochloride (Tab) Levocetirizine Dihydrochloride 5 MG         Lidocaine (Patch) LIDODERM 5 % Apply 1 Patch to skin as directed every 24 hours. as needed for nerve pain        Meloxicam (Tab) MOBIC 15 MG         Naproxen (Tab) NAPROSYN 250 MG Take 250 mg by mouth 2 times a day, with meals.        Oxycodone-Acetaminophen (Tab) PERCOCET-10  MG Take 1/2  to 1 tablet by mouth every 8 hours as needed for pain. Two month supply.        Progesterone Micronized (Cap) PROMETRIUM 200 MG Take 200 mg by mouth every day.        RifAXIMin (Tab) XIFAXAN 550 MG         Sertraline HCl (Tab) ZOLOFT 100 MG Take 50 mg  by mouth every day.        TraZODone HCl (Tab) DESYREL 50 MG Take 1/2  to 1 tablet by mouth at bedtime as needed for insomnia        .                 Medicines prescribed today were sent to:     [facility] [address] [facility] [place] - [address] [address]    [address] [address]    Phone: [phone] Fax: [phone]    Open 24 Hours?: No      Medication refill instructions:       If your prescription bottle indicates you have medication refills left, it is not necessary to call your provider’s office. Please contact your pharmacy and they will refill your medication.    If your prescription bottle indicates you do not have any refills left, you may request refills at any time through one of the following ways: The online [employer] system (except Urgent Care), by calling your provider’s office, or by asking your pharmacy to contact your provider’s office with a refill request. Medication refills are processed only during regular business hours and may not be available until the next business day. Your provider may request additional information or to have a follow-up visit with you prior to refilling your medication.   *Please Note: Medication refills are assigned a new Rx number when refilled electronically. Your pharmacy may indicate that no refills were authorized even though a new prescription for the same medication is available at the pharmacy. Please request the medicine by name with the pharmacy before contacting your provider for a refill.           [employer] Access Code: Activation code not generated  Current [employer] Status: Active

## 2017-04-17 NOTE — PROGRESS NOTES
SUBJECTIVE:   Ms. Taylor returns to the office today for followup evaluation of spinal/joint/musculoskeletal pain.    The patient underwent left cervical 4, 5, 6, and 7 medial branch blocks, facet blocks on 3/28/2017. The patient notes near complete relief of pain postprocedure, denies complications. At the follow-up visit today, the patient notes continued symptomatic relief of her neck pain, steroid effect. Neck pain now only intermittent in nature, controlled, without radicular component.    The patient underwent thoracic trigger point injections on 3/28/2017. She now notes mid back pain is controlled, denies complications.    She notes low back pain,  notes intermittent pain flares, primarily activity associated, relatively controlled, without overt radicular component. She has had benefit with prior injection therapy.    She notes intermittent left hip area pain, controlled.    She notes left foot/ankle area pain, seen by podiatrist, notes trial with oral steroid, injection, bracing, orthotics/footwear, now improving. I reviewed podiatry records.      Regarding wrist/hands, the patient has prior bilateral carpal tunnel release. The patient has history of right small finger laceration outside the area 8/2016, underwent right small finger radial digital nerve repair 8/17/2016, symptomatically improved, with care of her orthopedics    She notes intermittent headaches, relatively controlled.    She notes intermittent difficulty with sleep.    She has had prior treatment including medications. She has been to physical therapy, in retrial, further sessions apparently pending insurance authorization. She has tried acupuncture, chiropractics, and massage therapy, transiently palliative.   She has had prior injections, with benefit. She denies bowel/bladder dysfunction. She denies overt limb weakness. She denies cardiopulmonary symptomatology. She is making an  effort with her home exercise program. Pain/symptoms are relatively controlled with the treatment to date.      MEDICAL RECORDS REVIEW/DATA/REVIEW OF SYSTEMS:   Reviewed in epic.    I reviewed medications:     The patient returned original, unfilled Percocet prescription, written on 3/10/2017 with fill date 4/13/2017, shredded by our office, as patient notes she has pending travel outside the area and desires to discuss bridge medication for pending travel outside the area.     Since injections, has been able to decrease pain medication use. The pain/symptomatic medications, when used, have been somewhat helpful for controlling her pain, maintaining mood, and allowing her to function, including ADLs. Notes dry mouth with tizanidine, otherwise side effects are controlled.  No aberrant behavior noted. Did not tolerate gabapentin. Note prior trial, not effective or tolerated: prozac, baclofen, robaxin, xanax.    I reviewed  profile 4/17/2017, consistent. Reviewed controlled substance agreement 3/2016.      I reviewed diagnostic studies.     Reviewed radiographs. Reviewed MRI cervical spine 1/2017. Cervical spine x-rays 1/2014 showed mild degenerative changes, no instability.  Left shoulder x-rays 12/2016 showed  ac arthritis. MRI thoracic spine 10/2016 showed multilevel disc protrusions and degenerative changes, no intrinsic cord changes.  Reviewed MRI lumbar spine 4/2013, showed multilevel degenerative changes, disc protrusion and foraminal stenosis greatest at left L5-S1. Lumbar spine x-rays 4/2013 show degenerative changes, no instability. Left hip x-rays 2/2013 were unremarkable.  Reviewed chest CT 4/2016. Reviewed chest x-ray 3/2016.  Head CT 5/2014 was unremarkable. Reviewed pelvic ultrasound 1/2008.     Reviewed laboratory studies. Reviewed the labs 3/2017, including CMP, rheumatologic screen unremarkable. Reviewed TFTs 1/2016. A1C 3/2015 of 5.8.  Reviewed urine drug screen for 3/2017, consistent.    I  reviewed medical issues. Followed by primary care provider. Dermatology consulted regarding skin lesions.  Followed by ENT, notes sinus disease. Followed by GI, including regarding IBS. The patient notes she is perimenopausal, care per gynecology.      Review of systems: No fevers or chills noted. No cranial nerve symptoms are denoted. See history, otherwise review of systems unremarkable.     Family history: No changes noted. No neuromuscular disorder is noted.    I reviewed social issues. 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, beginning in May - Sep.      PAST MEDICAL HISTORY:   Past Medical History   Diagnosis Date   • Skin lesions    • Anxiety    • Depression    • Hypertension      with pain   • Asthma      once with an allergic reaction   • Headache    • Back pain        PAST SURGICAL HISTORY:    Past Surgical History   Procedure Laterality Date   • Knee reconstruction       Right ACL   • Carpal tunnel release       left   • Carpal tunnel release       right   • Nerve repair Right 8/17/2016     Procedure: NERVE REPAIR RADIAL DIGITAL SMALL FINGER;  Surgeon: Simone Mendoza M.D.;  Location: SURGERY [facility];  Service:        ALLERGIES:  Codeine    MEDICATIONS:    Outpatient Encounter Prescriptions as of 4/17/2017   Medication Sig Dispense Refill   • oxycodone-acetaminophen (PERCOCET-10)  MG Tab Take 1/2  to 1 tablet by mouth every 8 hours as needed for pain. Two month supply. 180 Tab 0   • sertraline (ZOLOFT) 100 MG Tab Take 50 mg by mouth every day.     • EVAMIST 1.53 MG/SPRAY SOLN Apply 1 Spray to affected area(s) every day.     • progesterone (PROMETRIUM) 200 MG capsule Take 200 mg by mouth every day.     • meloxicam (MOBIC) 15 MG tablet      • [DISCONTINUED] MethylPREDNISolone (MEDROL DOSEPAK) 4 MG Tablet Therapy Pack      • [DISCONTINUED] oxycodone-acetaminophen (PERCOCET-10)  MG Tab Take 1/2  to 1 tablet by mouth every 8 hours as needed for pain. 90 Tab 0   •  trazodone (DESYREL) 50 MG Tab Take 1/2  to 1 tablet by mouth at bedtime as needed for insomnia 30 Tab 5   • XIFAXAN 550 MG Tab tablet      • acyclovir (ZOVIRAX) 400 MG tablet      • Diclofenac Sodium 1 % Gel APPLY GEL TOPICALLY 3-4 TIMES DAILY RO LEFT FOOT FOR PAIN  0   • naproxen (NAPROSYN) 250 MG Tab Take 250 mg by mouth 2 times a day, with meals.     • PROVENTIL  (90 BASE) MCG/ACT Aero Soln inhalation aerosol      • acetaminophen/caffeine/butalbital 300-40-50 mg (FIORICET) -40 MG Cap capsule      • fluocinonide (LIDEX) 0.05 % Cream      • Levocetirizine Dihydrochloride 5 MG Tab      • diphenhydrAMINE (BENADRYL) 25 MG Tab Take 25 mg by mouth 2 times a day as needed for Sleep.     • fluticasone (FLONASE) 50 MCG/ACT nasal spray Spray 1 Spray in nose every day.     • lidocaine (LIDODERM) 5 % Patch Apply 1 Patch to skin as directed every 24 hours. as needed for nerve pain 30 Patch 5   • ibuprofen (MOTRIN) 200 MG TABS Take 200 mg by mouth 2 times a day as needed.       No facility-administered encounter medications on file as of 4/17/2017.       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     The patient denies  psychiatric history                      OBJECTIVE:   Vital signs: reviewed  Constitutional:  awake alert, no acute distress  HEENT: Normocephalic atraumatic, neck supple, no JVD, no meningeal signs  Lymphatic: No cervical, supraclavicular, or inguinal lymphadenopathy noted  Pulmonary: No tachypnea noted. No accessory muscle use noted, no dyspnea noted  Abdomen: Soft, nontender, nondistended, no HSM, no peritoneal signs, no CVA tenderness  Musculoskeletal:    Inspection: no deformity noted, healed scars consistent with prior surgeries  Palpation: Only mild erin with palpation cervical region, only mild tender left occipital region, only mild tenderness with palpation about left shoulder, only mild tender with palpation mid thoracic region, trigger points noted, only mild tender with palpation in lumbosacral region, minimal tender left hip/posterior pelvic region , mild tenderness left plantar and calcaneal region  Range of Motion: Mild decreased cervical spine range of motion, only mild pain with left shoulder range of motion testing, only mild pain with thoracic spine range of motion testing, only mild decreased lumbar spine range of motion, only mild mild pain at the extremes of left hip range of motion testing, only mild pain with left foot/ankle range of motion testing   Strength/Tone: 5/5 in upper and lower limbs  Neurologic:               Oriented ×3              Cranial nerves grossly intact   Reflexes: 1-2+ in upper and lower limbs, no upper motor neuron signs evident   Nerve Tests: Spurlings testing negative, straight leg testing negative, negative Tinel's in upper limbs   Coordination: gait mildly antalgic, reciprocal   Sensation: grossly intact in upper and lower limbs  Cardiovascular: Distal pulses 2+, including at wrist and ankles, no limb swelling noted  Skin: no rashes or lesions noted       ASSESSMENT:  1. Neck pain, myofascial pain, cervical radiculitis (controlled), degenerative disc disease,  foraminal stenosis, cervical facet syndrome, symptomatically controlled  2. Mid-back pain, myofascial pain, disc protrusion, degenerative disc disease, facet syndrome, symptomatically controlled   3. Low back pain, myofascial pain, lumbar radiculitis (relatively controlled), controlled, lumbar disc protrusion, degenerative disc disease, facet syndrome, relatively symptomatically controlled  4. Left shoulder area pain, sprain strain, ac arthritis, consider rotator cuff disorder, controlled  5. Left hip area/ischial tuberosity area pain, myofascial pain, hamstring strain, controlled  6. Status post right carpal tunnel release 8/2014, remote left carpal release  7. Left foot/ankle pain, sprain strain, plantar fasciitis, calcaneal spur, pes cavus, with care per podiatry, symmetrically improving  8. Insomnia  9. Controlled substance agreement signed  10. Comorbid medical issues, including IBS, skin lesions, with care per primary care provider and consultants      DISCUSSION/PLAN:  1. I discussed management options. I reviewed symptomatic care.  2. Continue with physical therapy, I would be interested in reviewing most recent records. I discussed efforts with home exercise program and activity modification. Right-hand activity/restrictions per orthopedics  3. The patient can consider trials with acupuncture, massage therapy, or TENS unit  4. I reviewed sleep hygiene  5. I reviewed headaches/migraine triggers.  6. I reviewed medication monitoring. I advise the patient that pain medication dosing is in the moderate range per guidelines, reviewed risks and alternatives. For now, continue current medications. I reviewed medication adjustments, including opioid taper, patient to consider. I wrote a new prescription for Percocet 10/325 1/2 to 1 tablet by mouth every 8 hours as needed for pain. #180, refill zero, 2 month supply, as the patient has pending travel outside the area, the earliest date the pharmacy may fill the  prescription is 4/21/2017. I counseled the patient regarding risks, side effects, and interactions of medications, including NSAIDs and over-the-counter medications. I reviewed tylenol/acetaminophen dosing. I reviewed serotonin syndrome risk. I reviewed bowel management program. I recommend avoid use of benzodiazepines due to the risk of interaction with pain medication. I advise the patient to avoid alcohol use. I counseled the patient on the controlled substance prescribing program. I reviewed further symptomatic medications.  7. I reviewed additional diagnostic options, including further/advanced imaging, electrodiagnostic testing, vascular studies, and further lab screen  8. I reviewed additional therapeutic options, including further injection therapy and additional consultative input  9. Return in 2 months or an as-needed basis      Please note that this dictation was created using voice recognition software. I have made every reasonable attempt to correct obvious errors but there may be errors of grammar and content that I may have overlooked prior to finalization of this note.

## 2017-09-05 ENCOUNTER — HOSPITAL ENCOUNTER (OUTPATIENT)
Dept: LAB | Facility: MEDICAL CENTER | Age: 52
End: 2017-09-05
Attending: PHYSICAL MEDICINE & REHABILITATION
Payer: COMMERCIAL

## 2017-09-05 ENCOUNTER — OFFICE VISIT (OUTPATIENT)
Dept: PHYSICAL MEDICINE AND REHAB | Facility: MEDICAL CENTER | Age: 52
End: 2017-09-05
Payer: COMMERCIAL

## 2017-09-05 VITALS
TEMPERATURE: 98.4 F | OXYGEN SATURATION: 96 % | BODY MASS INDEX: 23.22 KG/M2 | DIASTOLIC BLOOD PRESSURE: 76 MMHG | HEART RATE: 81 BPM | RESPIRATION RATE: 16 BRPM | SYSTOLIC BLOOD PRESSURE: 126 MMHG | WEIGHT: 136 LBS | HEIGHT: 64 IN

## 2017-09-05 DIAGNOSIS — M54.50 LUMBOSACRAL PAIN: ICD-10-CM

## 2017-09-05 DIAGNOSIS — M79.2 NERVE PAIN: ICD-10-CM

## 2017-09-05 DIAGNOSIS — M51.35 DDD (DEGENERATIVE DISC DISEASE), THORACOLUMBAR: ICD-10-CM

## 2017-09-05 DIAGNOSIS — M25.50 ARTHRALGIA, UNSPECIFIED JOINT: ICD-10-CM

## 2017-09-05 DIAGNOSIS — M54.50 CHRONIC MIDLINE LOW BACK PAIN WITHOUT SCIATICA: ICD-10-CM

## 2017-09-05 DIAGNOSIS — Z79.899 CONTROLLED SUBSTANCE AGREEMENT SIGNED: ICD-10-CM

## 2017-09-05 DIAGNOSIS — G89.29 CHRONIC MIDLINE LOW BACK PAIN WITHOUT SCIATICA: ICD-10-CM

## 2017-09-05 DIAGNOSIS — M54.9 MID BACK PAIN: ICD-10-CM

## 2017-09-05 DIAGNOSIS — M79.18 MYOFASCIAL PAIN: ICD-10-CM

## 2017-09-05 DIAGNOSIS — M54.2 NECK PAIN: ICD-10-CM

## 2017-09-05 PROCEDURE — G0480 DRUG TEST DEF 1-7 CLASSES: HCPCS

## 2017-09-05 PROCEDURE — 80307 DRUG TEST PRSMV CHEM ANLYZR: CPT

## 2017-09-05 PROCEDURE — 99214 OFFICE O/P EST MOD 30 MIN: CPT | Performed by: PHYSICAL MEDICINE & REHABILITATION

## 2017-09-05 RX ORDER — LIDOCAINE 50 MG/G
1 PATCH TOPICAL EVERY 24 HOURS
Qty: 30 PATCH | Refills: 5 | Status: SHIPPED | OUTPATIENT
Start: 2017-09-05 | End: 2020-02-13

## 2017-09-05 RX ORDER — OXYCODONE AND ACETAMINOPHEN 10; 325 MG/1; MG/1
TABLET ORAL
Qty: 60 TAB | Refills: 0 | Status: SHIPPED | OUTPATIENT
Start: 2017-09-05 | End: 2017-09-18 | Stop reason: SDUPTHER

## 2017-09-05 ASSESSMENT — PAIN SCALES - GENERAL: PAINLEVEL: 3=SLIGHT PAIN

## 2017-09-05 ASSESSMENT — PATIENT HEALTH QUESTIONNAIRE - PHQ9: CLINICAL INTERPRETATION OF PHQ2 SCORE: 0

## 2017-09-05 NOTE — PROGRESS NOTES
SUBJECTIVE:   Ms. Taylor returns to the office today for followup evaluation of spinal/joint/musculoskeletal pain.    Since I last saw her in 4/2017, the patient has been on travel/outside the area. The patient notes flare of neck pain and 8/2017, underwent cervical injection, without benefit. She has recently returned to the region.    Regarding today's visit:    The patient notes overall flare of neck as well as back pain, possibly associated with some physical activities.    She has pain most prominent in the lower thoracic region, also some posterior chest wall radiation.    She notes neck pain, without overt radicular component. The patient had benefit with prior injection therapy including trigger point injections and cervical facet blocks in 3/2017.    She notes lumbosacral pain, without overt radicular component.    She notes intermittent hip and shoulder area pain, controlled.    She notes history of left foot/ankle area pain, seen by podiatrist, notes trial with oral steroid, injection, bracing, orthotics/footwear. I reviewed prior podiatry records.      Regarding wrist/hands, the patient has prior bilateral carpal tunnel release. The patient has history of right small finger laceration outside the area 8/2016, underwent right small finger radial digital nerve repair 8/17/2016, symptomatically improved, with care of her orthopedics    She notes intermittent headaches, relatively controlled.    She notes intermittent difficulty with sleep.    She has had prior treatment including medications. She has been to physical therapy. She has tried acupuncture, chiropractics, and massage therapy, transiently palliative.   She has had prior injections, with benefit. She denies bowel/bladder dysfunction. She denies overt limb weakness. She denies cardiopulmonary symptomatology. She is making an effort with her home exercise program. The flare pain is limiting  her ability to function.      MEDICAL RECORDS REVIEW/DATA/REVIEW OF SYSTEMS:   Reviewed in epic.    I reviewed medications:  The pain/symptomatic medications, when used, have been somewhat helpful for controlling her pain, maintaining mood, and allowing her to function, including ADLs. Notes dry mouth with tizanidine, otherwise side effects are controlled.  No aberrant behavior noted. Did not tolerate gabapentin. Note prior trial, not effective or tolerated: prozac, baclofen, robaxin, xanax.    I reviewed  profile 9/5/2017, consistent, notes receives some pain medication in 8/2017 while in [place], records pending.. Reviewed controlled substance agreement 3/2016.      I reviewed diagnostic studies.     Reviewed radiographs. MRI thoracic spine 10/2016 showed multilevel disc protrusions and degenerative changes, no intrinsic cord changes.  Reviewed MRI cervical spine 1/2017. Cervical spine x-rays 1/2014 showed mild degenerative changes, no instability.  Reviewed MRI lumbar spine 4/2013, showed multilevel degenerative changes, disc protrusion and foraminal stenosis greatest at left L5-S1. Lumbar spine x-rays 4/2013 show degenerative changes, no instability. Left shoulder x-rays 12/2016 showed  ac arthritis. Left hip x-rays 2/2013 were unremarkable.  Reviewed chest CT 4/2016. Reviewed chest x-ray 3/2016.  Head CT 5/2014 was unremarkable. Reviewed pelvic ultrasound 1/2008.     Reviewed laboratory studies. Reviewed the labs 3/2017, including CMP, rheumatologic screen unremarkable. Reviewed TFTs 1/2016. A1C 3/2015 of 5.8.  Reviewed urine drug screen for 3/2017, consistent.    I reviewed medical issues. Followed by primary care provider. Dermatology consulted regarding skin lesions.  Followed by ENT, notes sinus disease. Followed by GI, including regarding IBS. The patient notes she is perimenopausal, care per gynecology.      Review of systems: No fevers or chills noted. No cranial nerve symptoms are denoted. See  history, otherwise review of systems unremarkable.     Family history: No changes noted. No neuromuscular disorder is noted.    I reviewed social issues. 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      PAST MEDICAL HISTORY:   Past Medical History:   Diagnosis Date   • Anxiety    • Asthma     once with an allergic reaction   • Back pain    • Depression    • Headache    • Hypertension     with pain   • Skin lesions        PAST SURGICAL HISTORY:    Past Surgical History:   Procedure Laterality Date   • CARPAL TUNNEL RELEASE      left   • CARPAL TUNNEL RELEASE      right   • KNEE RECONSTRUCTION      Right ACL   • NERVE REPAIR Right 8/17/2016    Procedure: NERVE REPAIR RADIAL DIGITAL SMALL FINGER;  Surgeon: Simone Mendoza M.D.;  Location: SURGERY [facility];  Service:        ALLERGIES:  Codeine    MEDICATIONS:    Outpatient Encounter Prescriptions as of 9/5/2017   Medication Sig Dispense Refill   • lidocaine (LIDODERM) 5 % Patch Apply 1 Patch to skin as directed every 24 hours. as needed for nerve pain 30 Patch 5   • oxycodone-acetaminophen (PERCOCET-10)  MG Tab Take 1/2  to 1 tablet by mouth twice per day as needed for pain. 60 Tab 0   • PROVENTIL  (90 BASE) MCG/ACT Aero Soln inhalation aerosol      • Levocetirizine Dihydrochloride 5 MG Tab      • sertraline (ZOLOFT) 100 MG Tab Take 50 mg by mouth every day.     • progesterone (PROMETRIUM) 200 MG capsule Take 200 mg by mouth every day.     • [DISCONTINUED] oxycodone-acetaminophen (PERCOCET-10)  MG Tab Take 1/2  to 1 tablet by mouth every 8 hours as needed for pain. Two month supply. 180 Tab 0   • [DISCONTINUED] meloxicam (MOBIC) 15 MG tablet      • trazodone (DESYREL) 50 MG Tab Take 1/2  to 1 tablet by mouth at bedtime as needed for insomnia 30 Tab 5   • XIFAXAN 550 MG Tab tablet      • acyclovir (ZOVIRAX) 400 MG tablet      • Diclofenac Sodium 1 % Gel APPLY GEL TOPICALLY 3-4 TIMES DAILY RO LEFT FOOT FOR PAIN  0   •  [DISCONTINUED] naproxen (NAPROSYN) 250 MG Tab Take 250 mg by mouth 2 times a day, with meals.     • acetaminophen/caffeine/butalbital 300-40-50 mg (FIORICET) -40 MG Cap capsule      • fluocinonide (LIDEX) 0.05 % Cream      • diphenhydrAMINE (BENADRYL) 25 MG Tab Take 25 mg by mouth 2 times a day as needed for Sleep.     • fluticasone (FLONASE) 50 MCG/ACT nasal spray Spray 1 Spray in nose every day.     • [DISCONTINUED] lidocaine (LIDODERM) 5 % Patch Apply 1 Patch to skin as directed every 24 hours. as needed for nerve pain 30 Patch 5   • EVAMIST 1.53 MG/SPRAY SOLN Apply 1 Spray to affected area(s) every day.     • ibuprofen (MOTRIN) 200 MG TABS Take 200 mg by mouth 2 times a day as needed.       No facility-administered encounter medications on file as of 9/5/2017.        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     The patient denies psychiatric history                      OBJECTIVE:   Vital signs: reviewed  Constitutional:  awake alert, no acute distress  HEENT: Normocephalic atraumatic, neck supple, no JVD, no meningeal signs  Lymphatic: No cervical, supraclavicular, or  inguinal lymphadenopathy noted  Pulmonary: No tachypnea noted. No accessory muscle use noted, no dyspnea noted  Abdomen: Soft, nontender, nondistended, no HSM, no peritoneal signs, no CVA tenderness  Musculoskeletal:    Inspection: no deformity noted, healed scars consistent with prior surgeries  Palpation: Only tender with palpation cervical region, only mild tender left occipital region, only mild tenderness with palpation about left shoulder, tender with palpation mid thoracic region, trigger points noted, tender with palpation in lumbosacral region, minimal tender left hip/posterior pelvic region , mild tenderness left plantar and calcaneal region  Range of Motion: Mild decreased cervical spine range of motion, only mild pain with left shoulder range of motion testing, only mild pain with thoracic spine range of motion testing, only mild decreased lumbar spine range of motion, only mild mild pain at the extremes of left hip range of motion testing, only mild pain with left foot/ankle range of motion testing   Strength/Tone: 5/5 in upper and lower limbs  Neurologic:               Oriented ×3              Cranial nerves grossly intact   Reflexes: 1-2+ in upper and lower limbs, no upper motor neuron signs evident   Nerve Tests: Spurlings testing negative, straight leg testing negative, negative Tinel's in upper limbs   Coordination: gait mildly antalgic, reciprocal   Sensation: grossly intact in upper and lower limbs  Cardiovascular: Distal pulses 2+, including at wrist and ankles, no limb swelling noted  Skin: no rashes or lesions noted       ASSESSMENT:  1. Flare of neck pain, myofascial pain, cervical radiculitis (controlled), degenerative disc disease, cervical spondylosis, foraminal stenosis, cervical facet syndrome  2. Flare of mid-back pain, myofascial pain, disc protrusion, degenerative disc disease, thoracic spondylosis, facet syndrome   3. Flare of low back pain, myofascial pain, lumbar radiculitis  (controlled), lumbar disc protrusion, degenerative disc disease, lumbar spondylosis, facet syndrome  4. Left shoulder area pain, sprain strain, ac arthritis, consider rotator cuff disorder, controlled  5. Left hip area/ischial tuberosity area pain, myofascial pain, hamstring strain, controlled  6. Status post right carpal tunnel release 8/2014, remote left carpal release  7. Left foot/ankle pain, sprain strain, plantar fasciitis, calcaneal spur, pes cavus, with care per podiatry, symmetrically improving  8. Insomnia  9. Controlled substance agreement signed  10. Comorbid medical issues, including IBS, skin lesions, with care per primary care provider and consultants      DISCUSSION/PLAN:  1. I discussed management options. I reviewed symptomatic care.  2. Request authorization for trigger point injections to treat the myofascial component of pain. Note, the patient has received benefit from prior trigger point injections, tolerated.  3. I ordered a urine drug screen as part of program  4. I reviewed therapy. I discussed efforts with home exercise program and activity modification. Right-hand activity/restrictions per orthopedics  5. The patient can consider trials with acupuncture, massage therapy, or TENS unit  6. I reviewed sleep hygiene  7. I reviewed headaches/migraine triggers.  8. I reviewed medication monitoring. I advise the patient that pain medication dosing is in the moderate range per federal guidelines, reviewed risks and alternatives. For now, continue current medications. I reviewed medication adjustments, including opioid taper. I wrote a new prescription for Percocet 10/325 1/2 to 1 tablet by mouth twice per day as needed for pain. #60, refill zero, this is a decrease in dosing/quantity, recommended for intermittent use. I counseled the patient regarding risks, side effects, and interactions of medications, including over-the-counter medications. I reviewed tylenol/acetaminophen dosing. I reviewed  serotonin syndrome risk. I reviewed bowel management program. I advised the patient to avoid sudafed/pseudoephedrine type medication and herbs/supplements. I recommend avoid use of benzodiazepines due to the risk of interaction with pain medication. I advise the patient to avoid alcohol use. I counseled the patient on the controlled substance prescribing program. I reviewed further symptomatic medications.  9. I reviewed additional diagnostic options, including further/advanced imaging, electrodiagnostic testing, vascular studies, and further lab screen  10. I reviewed additional therapeutic options, including further injection therapy and additional consultative input  11. Return in one month or an as-needed basis. Additionally, coordinate follow-up after getting above-noted injection authorization in place.      Please note that this dictation was created using voice recognition software. I have made every reasonable attempt to correct obvious errors but there may be errors of grammar and content that I may have overlooked prior to finalization of this note.

## 2017-09-06 ENCOUNTER — TELEPHONE (OUTPATIENT)
Dept: PHYSICAL MEDICINE AND REHAB | Facility: MEDICAL CENTER | Age: 52
End: 2017-09-06

## 2017-09-06 LAB
AMPHET CTO UR CFM-MCNC: NEGATIVE NG/ML
BARBITURATES CTO UR CFM-MCNC: NEGATIVE NG/ML
BENZODIAZ CTO UR CFM-MCNC: NEGATIVE NG/ML
BUPRENORPHINE UR-MCNC: NEGATIVE NG/ML
CANNABINOIDS CTO UR CFM-MCNC: NEGATIVE NG/ML
CARISOPRODOL UR-MCNC: NEGATIVE NG/ML
COCAINE CTO UR CFM-MCNC: NEGATIVE NG/ML
DRUG SCREEN COMMENT UR-IMP: NORMAL
ETHYL GLUCURONIDE UR QL SCN: NEGATIVE NG/ML
FENTANYL UR-MCNC: NEGATIVE NG/ML
MEPERIDINE CTO UR SCN-MCNC: NEGATIVE NG/ML
METHADONE CTO UR CFM-MCNC: NEGATIVE NG/ML
OPIATES UR QL SCN: NEGATIVE NG/ML
OXYCDOXYM URSCRN 2005102: POSITIVE NG/ML
PCP CTO UR CFM-MCNC: NEGATIVE NG/ML
PROPOXYPH CTO UR CFM-MCNC: NEGATIVE NG/ML
TAPENTADOL UR-MCNC: NEGATIVE NG/ML
TRAMADOL CTO UR SCN-MCNC: NEGATIVE NG/ML
ZOLPIDEM UR-MCNC: NEGATIVE NG/ML

## 2017-09-07 LAB
6MAM UR CFM-MCNC: <10 NG/ML
CODEINE UR CFM-MCNC: <20 NG/ML
HYDROCODONE UR CFM-MCNC: <20 NG/ML
HYDROMORPHONE UR CFM-MCNC: <20 NG/ML
MORPHINE UR CFM-MCNC: <20 NG/ML
NORHYDROCODONE UR CFM-MCNC: <20 NG/ML
NOROXYCODONE UR CFM-MCNC: 568 NG/ML
OPIATES UR NOROXYM Q0836: 147 NG/ML
OXYCODONE UR CFM-MCNC: 68 NG/ML
OXYMORPHONE UR CFM-MCNC: <20 NG/ML

## 2017-09-12 ENCOUNTER — TELEPHONE (OUTPATIENT)
Dept: PHYSICAL MEDICINE AND REHAB | Facility: MEDICAL CENTER | Age: 52
End: 2017-09-12

## 2017-09-18 ENCOUNTER — OFFICE VISIT (OUTPATIENT)
Dept: PHYSICAL MEDICINE AND REHAB | Facility: MEDICAL CENTER | Age: 52
End: 2017-09-18
Payer: COMMERCIAL

## 2017-09-18 VITALS
SYSTOLIC BLOOD PRESSURE: 118 MMHG | WEIGHT: 134 LBS | HEIGHT: 64 IN | BODY MASS INDEX: 22.88 KG/M2 | HEART RATE: 72 BPM | TEMPERATURE: 98.6 F | DIASTOLIC BLOOD PRESSURE: 78 MMHG | OXYGEN SATURATION: 95 %

## 2017-09-18 DIAGNOSIS — G89.29 CHRONIC MIDLINE LOW BACK PAIN WITHOUT SCIATICA: ICD-10-CM

## 2017-09-18 DIAGNOSIS — M25.50 ARTHRALGIA, UNSPECIFIED JOINT: ICD-10-CM

## 2017-09-18 DIAGNOSIS — Z79.899 CONTROLLED SUBSTANCE AGREEMENT SIGNED: ICD-10-CM

## 2017-09-18 DIAGNOSIS — M54.50 CHRONIC MIDLINE LOW BACK PAIN WITHOUT SCIATICA: ICD-10-CM

## 2017-09-18 DIAGNOSIS — M51.35 DDD (DEGENERATIVE DISC DISEASE), THORACOLUMBAR: ICD-10-CM

## 2017-09-18 DIAGNOSIS — M54.9 MID BACK PAIN: ICD-10-CM

## 2017-09-18 DIAGNOSIS — M79.18 MYOFASCIAL PAIN: ICD-10-CM

## 2017-09-18 DIAGNOSIS — M54.2 NECK PAIN: ICD-10-CM

## 2017-09-18 DIAGNOSIS — M54.6 THORACOLUMBAR BACK PAIN: ICD-10-CM

## 2017-09-18 DIAGNOSIS — M54.50 THORACOLUMBAR BACK PAIN: ICD-10-CM

## 2017-09-18 PROCEDURE — 20553 NJX 1/MLT TRIGGER POINTS 3/>: CPT | Performed by: PHYSICAL MEDICINE & REHABILITATION

## 2017-09-18 PROCEDURE — 99212 OFFICE O/P EST SF 10 MIN: CPT | Mod: 25 | Performed by: PHYSICAL MEDICINE & REHABILITATION

## 2017-09-18 RX ORDER — METHYLPREDNISOLONE ACETATE 40 MG/ML
40 INJECTION, SUSPENSION INTRA-ARTICULAR; INTRALESIONAL; INTRAMUSCULAR; SOFT TISSUE ONCE
Status: COMPLETED | OUTPATIENT
Start: 2017-09-18 | End: 2017-09-18

## 2017-09-18 RX ORDER — CEFUROXIME AXETIL 250 MG/1
TABLET ORAL
COMMUNITY
Start: 2017-08-03 | End: 2018-04-24

## 2017-09-18 RX ORDER — OXYCODONE AND ACETAMINOPHEN 10; 325 MG/1; MG/1
TABLET ORAL
Qty: 20 TAB | Refills: 0 | Status: SHIPPED | OUTPATIENT
Start: 2017-09-18 | End: 2018-04-24

## 2017-09-18 RX ADMIN — METHYLPREDNISOLONE ACETATE 40 MG: 40 INJECTION, SUSPENSION INTRA-ARTICULAR; INTRALESIONAL; INTRAMUSCULAR; SOFT TISSUE at 17:22

## 2017-09-19 NOTE — PROGRESS NOTES
SUBJECTIVE:   Ms. Taylor returns to the office today for followup evaluation of spinal/joint/musculoskeletal pain.    The patient notes ongoing pain in the left mid and lower cervical region, also some radiation towards the left trapezius, without overt radicular component. She has had benefit with prior injection therapy, including trigger point injections as well as cervical facet blocks in 3/2017.    She notes ongoing pain in the thoracolumbar junction region, constant, with intermittent posterior chest wall radiation. No lumbar radicular pain noted.    She notes intermittent hip and shoulder area pain, controlled.    She notes history of left foot/ankle area pain, seen by podiatrist, notes trial with oral steroid, injection, bracing, orthotics/footwear. I reviewed prior podiatry records.      Regarding wrist/hands, the patient has prior bilateral carpal tunnel release. The patient has history of right small finger laceration outside the area 8/2016, underwent right small finger radial digital nerve repair 8/17/2016, symptomatically improved, with care of her orthopedics    She notes intermittent headaches, relatively controlled.    She notes intermittent difficulty with sleep.    She has had prior treatment including medications. She has been to physical therapy. She has tried acupuncture, chiropractics, and massage therapy, transiently palliative.   She has had prior injections, with benefit. She denies bowel/bladder dysfunction. She denies overt limb weakness. She denies cardiopulmonary symptomatology. She is making an effort with her home exercise program. The ongoing pain is limiting her ability to function.      MEDICAL RECORDS REVIEW/DATA/REVIEW OF SYSTEMS:   Reviewed in epic.    I reviewed medications:  Tolerating opioid taper. The pain/symptomatic medications, when used, have been somewhat helpful for controlling her pain, maintaining mood, and  allowing her to function, including ADLs. Notes dry mouth with tizanidine, otherwise side effects are controlled.  No aberrant behavior noted. Did not tolerate gabapentin. Note prior trial, not effective or tolerated: prozac, baclofen, robaxin, xanax.    I reviewed  profile 9/18/2017, note, pharmacy only filled #14 of Percocet prescription written last visit due to insurance issue.  Reviewed controlled substance agreement 3/2016.      I reviewed diagnostic studies.     Reviewed radiographs:     Reviewed MRI cervical spine 1/2017. Cervical spine x-rays 1/2014 showed mild degenerative changes, no instability.     MRI thoracic spine 10/2016 showed multilevel disc protrusions and degenerative changes, no intrinsic cord changes. Reviewed chest CT 4/2016. Reviewed chest x-ray 3/2016.     Reviewed MRI lumbar spine 4/2013, showed multilevel degenerative changes, disc protrusion and foraminal stenosis greatest at left L5-S1. Lumbar spine x-rays 4/2013 show degenerative changes, no instability.     Left shoulder x-rays 12/2016 showed  ac arthritis. Left hip x-rays 2/2013 were unremarkable.       Head CT 5/2014 was unremarkable.     Reviewed laboratory studies. Reviewed the labs 3/2017, including CMP, rheumatologic screen unremarkable. Reviewed TFTs 1/2016. A1C 3/2015 of 5.8.  Reviewed urine drug screen for 9/2017, consistent.    I reviewed medical issues. Followed by primary care provider. Dermatology consulted regarding skin lesions.  Followed by ENT, notes sinus disease. Followed by GI, including regarding IBS. The patient notes she is perimenopausal, care per gynecology.      Review of systems: No fevers or chills noted. No cranial nerve symptoms are denoted. See history, otherwise review of systems unremarkable.     Family history: No changes noted. No neuromuscular disorder is noted.    I reviewed social issues. 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[place], seasonal      PAST MEDICAL HISTORY:   Past  Medical History:   Diagnosis Date   • Anxiety    • Asthma     once with an allergic reaction   • Back pain    • Depression    • Headache    • Hypertension     with pain   • Skin lesions        PAST SURGICAL HISTORY:    Past Surgical History:   Procedure Laterality Date   • CARPAL TUNNEL RELEASE      left   • CARPAL TUNNEL RELEASE      right   • KNEE RECONSTRUCTION      Right ACL   • NERVE REPAIR Right 8/17/2016    Procedure: NERVE REPAIR RADIAL DIGITAL SMALL FINGER;  Surgeon: Simone Mendoza M.D.;  Location: SURGERY [facility];  Service:        ALLERGIES:  Codeine    MEDICATIONS:    Outpatient Encounter Prescriptions as of 9/18/2017   Medication Sig Dispense Refill   • oxycodone-acetaminophen (PERCOCET-10)  MG Tab Take 1 tablet by mouth three times per day as needed for severe pain. 20 Tab 0   • Levocetirizine Dihydrochloride 5 MG Tab      • sertraline (ZOLOFT) 100 MG Tab Take 50 mg by mouth every day.     • cefUROXime (CEFTIN) 250 MG Tab      • lidocaine (LIDODERM) 5 % Patch Apply 1 Patch to skin as directed every 24 hours. as needed for nerve pain 30 Patch 5   • [DISCONTINUED] oxycodone-acetaminophen (PERCOCET-10)  MG Tab Take 1/2  to 1 tablet by mouth twice per day as needed for pain. 60 Tab 0   • trazodone (DESYREL) 50 MG Tab Take 1/2  to 1 tablet by mouth at bedtime as needed for insomnia 30 Tab 5   • XIFAXAN 550 MG Tab tablet      • acyclovir (ZOVIRAX) 400 MG tablet      • Diclofenac Sodium 1 % Gel APPLY GEL TOPICALLY 3-4 TIMES DAILY RO LEFT FOOT FOR PAIN  0   • PROVENTIL  (90 BASE) MCG/ACT Aero Soln inhalation aerosol      • acetaminophen/caffeine/butalbital 300-40-50 mg (FIORICET) -40 MG Cap capsule      • fluocinonide (LIDEX) 0.05 % Cream      • diphenhydrAMINE (BENADRYL) 25 MG Tab Take 25 mg by mouth 2 times a day as needed for Sleep.     • fluticasone (FLONASE) 50 MCG/ACT nasal spray Spray 1 Spray in nose every day.     • EVAMIST 1.53 MG/SPRAY SOLN Apply 1 Spray to  affected area(s) every day.     • progesterone (PROMETRIUM) 200 MG capsule Take 200 mg by mouth every day.     • ibuprofen (MOTRIN) 200 MG TABS Take 200 mg by mouth 2 times a day as needed.       No facility-administered encounter medications on file as of 9/18/2017.        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     The patient denies psychiatric history                      OBJECTIVE:   Vital signs: reviewed  Constitutional:  awake alert, no acute distress  HEENT: Normocephalic atraumatic, neck supple, no JVD, no meningeal signs  Lymphatic: No cervical, supraclavicular, or inguinal lymphadenopathy noted  Pulmonary: No tachypnea noted. No accessory muscle use noted, no dyspnea noted  Abdomen: Soft, nontender, nondistended, no HSM, no peritoneal signs, no CVA tenderness  Musculoskeletal:    Inspection: no deformity noted, healed scars consistent with prior surgeries  Palpation: Only tender with palpation cervical region, only mild tender left occipital region, only mild tenderness with palpation about left shoulder, tender with palpation mid thoracic region, trigger points noted,  tender with palpation in lumbosacral region, minimal tender left hip/posterior pelvic region , mild tenderness left plantar and calcaneal region  Range of Motion: Mild decreased cervical spine range of motion, only mild pain with left shoulder range of motion testing, only mild pain with thoracic spine range of motion testing, only mild decreased lumbar spine range of motion, only mild mild pain at the extremes of left hip range of motion testing, only mild pain with left foot/ankle range of motion testing   Strength/Tone: 5/5 in upper and lower limbs  Neurologic:               Oriented ×3              Cranial nerves grossly intact   Reflexes: 1-2+ in upper and lower limbs, no upper motor neuron signs evident   Nerve Tests: Spurlings testing negative, straight leg testing negative, negative Tinel's in upper limbs   Coordination: gait mildly antalgic, reciprocal   Sensation: grossly intact in upper and lower limbs  Cardiovascular: Distal pulses 2+, including at wrist and ankles, no limb swelling noted  Skin: no rashes or lesions noted      Procedure note: Written/informed consent was obtained, risks benefits and alternatives discussed, and all questions were answered. The patient was placed prone on the exam table and triggerpoints were identified, one each in the paraspinal muscles in the thoracolumbar junction region, one in the left mid cervical paraspinal muscles, and one in the left upper trapezius muscle. The areas were marked, then sterilely prepped, Then using a 25-gauge needle, trigger point injections were performed with injection of 1 cc of a mixture of 1 cc of Depo-Medrol 40 mg/cc and 3 cc of 1% lidocaine at each site. The patient tolerated the procedure well. There were no complications.       ASSESSMENT:  1. Flare of neck pain, myofascial pain, cervical radiculitis (controlled), degenerative disc disease, cervical spondylosis, foraminal stenosis, cervical facet syndrome  2. Flare of mid-back pain,  myofascial pain, disc protrusion, degenerative disc disease, thoracic spondylosis, facet syndrome   3. Flare of low back pain, myofascial pain, lumbar radiculitis (controlled), lumbar disc protrusion, degenerative disc disease, lumbar spondylosis, facet syndrome  4. Left shoulder area pain, sprain strain, ac arthritis, consider rotator cuff disorder, controlled  5. Left hip area/ischial tuberosity area pain, myofascial pain, hamstring strain, controlled  6. Status post right carpal tunnel release 8/2014, remote left carpal release  7. Left foot/ankle pain, sprain strain, plantar fasciitis, calcaneal spur, pes cavus, with care per podiatry, symmetrically improving  8. Insomnia  9. Controlled substance agreement signed  10. Comorbid medical issues, including irritable bowel syndrome, skin lesions, with care per primary care provider and consultants      DISCUSSION/PLAN:  1. I reviewed post procedure precautions   2. I discussed management options. I reviewed symptomatic care.  3. Request authorization for further trigger point injections to treat the myofascial component of pain. Note, the patient has received benefit from prior trigger point injections, tolerated.  4. I reviewed therapy. I discussed efforts with home exercise program and activity modification. Right-hand activity/restrictions per orthopedics  5. The patient can consider trials with acupuncture, massage therapy, or TENS unit  6. I reviewed sleep hygiene  7. I reviewed headaches/migraine triggers.  8. I reviewed medication monitoring. I advise the patient that pain medication dosing is in the moderate range per federal guidelines, reviewed risks and alternatives. For now, continue current medications. I reviewed medication adjustments, including opioid taper, see rx. I wrote a new prescription for Percocet 10/325 1 tablet by mouth 3 times per day as needed for severe pain. #20, refill zero, this is a decrease in quantity, recommended for intermittent  use for pain flares. I counseled the patient regarding risks, side effects, and interactions of medications, including over-the-counter medications. I reviewed tylenol/acetaminophen dosing. I reviewed serotonin syndrome risk. I reviewed bowel management program. I advised the patient to avoid sudafed/pseudoephedrine type medication and herbs/supplements. I recommend avoid use of benzodiazepines due to the risk of interaction with pain medication. I advise the patient to avoid alcohol use. I counseled the patient on the controlled substance prescribing program. I reviewed further symptomatic medications.  9. I reviewed additional diagnostic options, including further/advanced imaging, electrodiagnostic testing, vascular studies, and further lab screen  10. I reviewed additional therapeutic options, including further injection therapy and additional consultative input  11. Return in 3-4 weeks or an as-needed basis.      Please note that this dictation was created using voice recognition software. I have made every reasonable attempt to correct obvious errors but there may be errors of grammar and content that I may have overlooked prior to finalization of this note.

## 2017-09-27 ENCOUNTER — TELEPHONE (OUTPATIENT)
Dept: PHYSICAL MEDICINE AND REHAB | Facility: MEDICAL CENTER | Age: 52
End: 2017-09-27

## 2018-04-24 ENCOUNTER — HOSPITAL ENCOUNTER (OUTPATIENT)
Dept: LAB | Facility: MEDICAL CENTER | Age: 53
End: 2018-04-24
Attending: PHYSICAL MEDICINE & REHABILITATION
Payer: COMMERCIAL

## 2018-04-24 ENCOUNTER — HOSPITAL ENCOUNTER (OUTPATIENT)
Dept: LAB | Facility: MEDICAL CENTER | Age: 53
End: 2018-04-24
Attending: OBSTETRICS & GYNECOLOGY
Payer: COMMERCIAL

## 2018-04-24 ENCOUNTER — OFFICE VISIT (OUTPATIENT)
Dept: PHYSICAL MEDICINE AND REHAB | Facility: MEDICAL CENTER | Age: 53
End: 2018-04-24
Payer: COMMERCIAL

## 2018-04-24 VITALS
HEART RATE: 67 BPM | SYSTOLIC BLOOD PRESSURE: 112 MMHG | TEMPERATURE: 97.5 F | WEIGHT: 138 LBS | HEIGHT: 64 IN | DIASTOLIC BLOOD PRESSURE: 74 MMHG | OXYGEN SATURATION: 98 % | BODY MASS INDEX: 23.56 KG/M2

## 2018-04-24 DIAGNOSIS — M47.816 LUMBAR SPONDYLOSIS: ICD-10-CM

## 2018-04-24 DIAGNOSIS — M54.2 ACUTE NECK PAIN: ICD-10-CM

## 2018-04-24 DIAGNOSIS — M54.9 ACUTE MID BACK PAIN: ICD-10-CM

## 2018-04-24 DIAGNOSIS — Z79.899 CONTROLLED SUBSTANCE AGREEMENT SIGNED: ICD-10-CM

## 2018-04-24 DIAGNOSIS — Z71.89 PAIN MEDICATION AGREEMENT DISCUSSED: ICD-10-CM

## 2018-04-24 DIAGNOSIS — M54.50 CHRONIC MIDLINE LOW BACK PAIN WITHOUT SCIATICA: ICD-10-CM

## 2018-04-24 DIAGNOSIS — G89.29 CHRONIC MIDLINE LOW BACK PAIN WITHOUT SCIATICA: ICD-10-CM

## 2018-04-24 DIAGNOSIS — M54.50 ACUTE LOW BACK PAIN DUE TO SPINAL DISORDER: ICD-10-CM

## 2018-04-24 DIAGNOSIS — M25.559 ARTHRALGIA OF HIP, UNSPECIFIED LATERALITY: ICD-10-CM

## 2018-04-24 DIAGNOSIS — M79.18 MYOFASCIAL PAIN: ICD-10-CM

## 2018-04-24 DIAGNOSIS — M54.9 MID BACK PAIN: ICD-10-CM

## 2018-04-24 DIAGNOSIS — M53.9 ACUTE LOW BACK PAIN DUE TO SPINAL DISORDER: ICD-10-CM

## 2018-04-24 DIAGNOSIS — M51.35 DDD (DEGENERATIVE DISC DISEASE), THORACOLUMBAR: ICD-10-CM

## 2018-04-24 DIAGNOSIS — M54.16 LUMBAR RADICULITIS: ICD-10-CM

## 2018-04-24 DIAGNOSIS — M25.50 ARTHRALGIA, UNSPECIFIED JOINT: ICD-10-CM

## 2018-04-24 DIAGNOSIS — M62.838 MUSCLE SPASM: ICD-10-CM

## 2018-04-24 LAB
25(OH)D3 SERPL-MCNC: 28 NG/ML (ref 30–100)
ALBUMIN SERPL BCP-MCNC: 4.4 G/DL (ref 3.2–4.9)
ALBUMIN/GLOB SERPL: 1.5 G/DL
ALP SERPL-CCNC: 62 U/L (ref 30–99)
ALT SERPL-CCNC: 15 U/L (ref 2–50)
ANION GAP SERPL CALC-SCNC: 5 MMOL/L (ref 0–11.9)
AST SERPL-CCNC: 16 U/L (ref 12–45)
BASOPHILS # BLD AUTO: 0.7 % (ref 0–1.8)
BASOPHILS # BLD: 0.05 K/UL (ref 0–0.12)
BILIRUB SERPL-MCNC: 1.1 MG/DL (ref 0.1–1.5)
BUN SERPL-MCNC: 16 MG/DL (ref 8–22)
CALCIUM SERPL-MCNC: 9.6 MG/DL (ref 8.5–10.5)
CHLORIDE SERPL-SCNC: 105 MMOL/L (ref 96–112)
CHOLEST SERPL-MCNC: 237 MG/DL (ref 100–199)
CO2 SERPL-SCNC: 26 MMOL/L (ref 20–33)
CREAT SERPL-MCNC: 0.82 MG/DL (ref 0.5–1.4)
EOSINOPHIL # BLD AUTO: 0.06 K/UL (ref 0–0.51)
EOSINOPHIL NFR BLD: 0.8 % (ref 0–6.9)
ERYTHROCYTE [DISTWIDTH] IN BLOOD BY AUTOMATED COUNT: 46.2 FL (ref 35.9–50)
EST. AVERAGE GLUCOSE BLD GHB EST-MCNC: 126 MG/DL
GLOBULIN SER CALC-MCNC: 2.9 G/DL (ref 1.9–3.5)
GLUCOSE SERPL-MCNC: 86 MG/DL (ref 65–99)
HBA1C MFR BLD: 6 % (ref 0–5.6)
HCT VFR BLD AUTO: 43.6 % (ref 37–47)
HDLC SERPL-MCNC: 103 MG/DL
HGB BLD-MCNC: 14.3 G/DL (ref 12–16)
IMM GRANULOCYTES # BLD AUTO: 0.05 K/UL (ref 0–0.11)
IMM GRANULOCYTES NFR BLD AUTO: 0.7 % (ref 0–0.9)
LDLC SERPL CALC-MCNC: 123 MG/DL
LYMPHOCYTES # BLD AUTO: 1.64 K/UL (ref 1–4.8)
LYMPHOCYTES NFR BLD: 22.7 % (ref 22–41)
MCH RBC QN AUTO: 30.4 PG (ref 27–33)
MCHC RBC AUTO-ENTMCNC: 32.8 G/DL (ref 33.6–35)
MCV RBC AUTO: 92.6 FL (ref 81.4–97.8)
MONOCYTES # BLD AUTO: 0.59 K/UL (ref 0–0.85)
MONOCYTES NFR BLD AUTO: 8.2 % (ref 0–13.4)
NEUTROPHILS # BLD AUTO: 4.82 K/UL (ref 2–7.15)
NEUTROPHILS NFR BLD: 66.9 % (ref 44–72)
NRBC # BLD AUTO: 0 K/UL
NRBC BLD-RTO: 0 /100 WBC
PLATELET # BLD AUTO: 340 K/UL (ref 164–446)
PMV BLD AUTO: 9.9 FL (ref 9–12.9)
POTASSIUM SERPL-SCNC: 3.9 MMOL/L (ref 3.6–5.5)
PROT SERPL-MCNC: 7.3 G/DL (ref 6–8.2)
RBC # BLD AUTO: 4.71 M/UL (ref 4.2–5.4)
SODIUM SERPL-SCNC: 136 MMOL/L (ref 135–145)
T4 FREE SERPL-MCNC: 0.87 NG/DL (ref 0.53–1.43)
TRIGL SERPL-MCNC: 53 MG/DL (ref 0–149)
TSH SERPL DL<=0.005 MIU/L-ACNC: 1.26 UIU/ML (ref 0.38–5.33)
WBC # BLD AUTO: 7.2 K/UL (ref 4.8–10.8)

## 2018-04-24 PROCEDURE — 82306 VITAMIN D 25 HYDROXY: CPT

## 2018-04-24 PROCEDURE — 80307 DRUG TEST PRSMV CHEM ANLYZR: CPT

## 2018-04-24 PROCEDURE — 36415 COLL VENOUS BLD VENIPUNCTURE: CPT

## 2018-04-24 PROCEDURE — 80053 COMPREHEN METABOLIC PANEL: CPT

## 2018-04-24 PROCEDURE — 83036 HEMOGLOBIN GLYCOSYLATED A1C: CPT

## 2018-04-24 PROCEDURE — 84443 ASSAY THYROID STIM HORMONE: CPT

## 2018-04-24 PROCEDURE — 85025 COMPLETE CBC W/AUTO DIFF WBC: CPT

## 2018-04-24 PROCEDURE — 84439 ASSAY OF FREE THYROXINE: CPT

## 2018-04-24 PROCEDURE — 80061 LIPID PANEL: CPT

## 2018-04-24 PROCEDURE — 99214 OFFICE O/P EST MOD 30 MIN: CPT | Performed by: PHYSICAL MEDICINE & REHABILITATION

## 2018-04-24 RX ORDER — DULOXETIN HYDROCHLORIDE 20 MG/1
20 CAPSULE, DELAYED RELEASE ORAL DAILY
COMMUNITY

## 2018-04-24 RX ORDER — METAXALONE 800 MG/1
800 TABLET ORAL 3 TIMES DAILY
Qty: 20 TAB | Refills: 1 | Status: SHIPPED | OUTPATIENT
Start: 2018-04-24 | End: 2019-02-07 | Stop reason: SDUPTHER

## 2018-04-24 RX ORDER — AMLODIPINE BESYLATE 2.5 MG/1
2.5 TABLET ORAL DAILY
COMMUNITY
End: 2019-02-25

## 2018-04-24 RX ORDER — OXYCODONE AND ACETAMINOPHEN 10; 325 MG/1; MG/1
1 TABLET ORAL 2 TIMES DAILY PRN
Qty: 10 TAB | Refills: 0 | Status: SHIPPED | OUTPATIENT
Start: 2018-04-24 | End: 2019-02-07 | Stop reason: SDUPTHER

## 2018-04-24 ASSESSMENT — PATIENT HEALTH QUESTIONNAIRE - PHQ9: CLINICAL INTERPRETATION OF PHQ2 SCORE: 0

## 2018-04-24 NOTE — PROGRESS NOTES
Special Procedures H&P:    SUBJECTIVE:   Ms. Taylor notes ongoing pain in the lumbosacral region, left greater than right, with radiating pain into the left lower limb with neuropathic component, worse with activities. The patient had prior lumbar epidural steroid injection in 4/2016, with benefit.The patient notes flare of mid back pain, without overt radicular component. She has had benefit with prior trigger point injections. The patient notes flare of neck pain, primarily in the left mid and lower cervical region, without overt radicular component.  She has had benefit with prior injection therapy, including trigger point injections as well as cervical facet blocks in 3/2017. She has had prior treatment including medications, including NSAIDs. She has been to physical therapy. She has tried acupuncture, chiropractics, and massage therapy, transiently palliative.   She has had prior injections, with benefit. She denies bowel/bladder dysfunction. She denies overt limb weakness. She denies cardiopulmonary symptomatology. She is making an effort with her home exercise program. The flare pain limits her ability to function.     MEDICAL RECORDS REVIEW/DATA/REVIEW OF SYSTEMS:   Reviewed in epic.     I reviewed medications:          I reviewed diagnostic studies.      Reviewed radiographs:      Reviewed MRI lumbar spine 4/2013, showed multilevel degenerative changes, disc protrusion and foraminal stenosis greatest at left L5-S1. Lumbar spine x-rays 4/2013 show degenerative changes, no instability.      Reviewed MRI cervical spine 1/2017. Cervical spine x-rays 1/2014 showed mild degenerative changes, no instability.      MRI thoracic spine 10/2016 showed multilevel disc protrusions and degenerative changes, no intrinsic cord changes. Reviewed chest CT 4/2016. Reviewed chest x-ray 3/2016.      Reviewed laboratory studies. Reviewed the labs 3/2017, including CMP, rheumatologic screen unremarkable. Reviewed TFTs  1/2016. A1C 3/2015 of 5.8.       I reviewed medical issues. Followed by primary care provider. Dermatology consulted regarding skin lesions.  Followed by ENT, notes sinus disease. Followed by GI, including regarding IBS.       Review of systems: No fevers or chills noted. No cranial nerve symptoms are denoted. See history, otherwise review of systems unremarkable.      Family history: No changes noted. No neuromuscular disorder is noted.     I reviewed social issues.       PAST MEDICAL HISTORY:   Past Medical History        Past Medical History:   Diagnosis Date   • Anxiety     • Asthma       once with an allergic reaction   • Back pain     • Depression     • Headache     • Hypertension       with pain   • Skin lesions              PAST SURGICAL HISTORY:    Past Surgical History         Past Surgical History:   Procedure Laterality Date   • NERVE REPAIR Right 8/17/2016     Procedure: NERVE REPAIR RADIAL DIGITAL SMALL FINGER;  Surgeon: Simone Mendoza M.D.;  Location: SURGERY [facility];  Service:    • CARPAL TUNNEL RELEASE         left   • CARPAL TUNNEL RELEASE         right   • KNEE RECONSTRUCTION         Right ACL            ALLERGIES:  Codeine     MEDICATIONS:    Encounter Medications          Outpatient Encounter Prescriptions as of 4/24/2018   Medication Sig Dispense Refill   • DULoxetine (CYMBALTA) 20 MG Cap DR Particles Take 20 mg by mouth every day.       • amLODIPine (NORVASC) 2.5 MG Tab Take 2.5 mg by mouth every day.       • oxyCODONE-acetaminophen (PERCOCET-10)  MG Tab Take 1 Tab by mouth 2 times a day as needed for Severe Pain for up to 5 days. 10 Tab 0   • metaxalone (SKELAXIN) 800 MG Tab Take 1 Tab by mouth 3 times a day. as needed for muscle spasm 20 Tab 1   • XIFAXAN 550 MG Tab tablet         • fluticasone (FLONASE) 50 MCG/ACT nasal spray Spray 1 Spray in nose every day.       • EVAMIST 1.53 MG/SPRAY SOLN Apply 1 Spray to affected area(s) every day.       • progesterone (PROMETRIUM)  200 MG capsule Take 200 mg by mouth every day.       • [DISCONTINUED] cefUROXime (CEFTIN) 250 MG Tab         • [DISCONTINUED] oxycodone-acetaminophen (PERCOCET-10)  MG Tab Take 1 tablet by mouth three times per day as needed for severe pain. 20 Tab 0   • lidocaine (LIDODERM) 5 % Patch Apply 1 Patch to skin as directed every 24 hours. as needed for nerve pain 30 Patch 5   • trazodone (DESYREL) 50 MG Tab Take 1/2  to 1 tablet by mouth at bedtime as needed for insomnia 30 Tab 5   • acyclovir (ZOVIRAX) 400 MG tablet         • [DISCONTINUED] Diclofenac Sodium 1 % Gel APPLY GEL TOPICALLY 3-4 TIMES DAILY RO LEFT FOOT FOR PAIN   0   • fluocinonide (LIDEX) 0.05 % Cream         • Levocetirizine Dihydrochloride 5 MG Tab         • diphenhydrAMINE (BENADRYL) 25 MG Tab Take 25 mg by mouth 2 times a day as needed for Sleep.       • [DISCONTINUED] PROVENTIL  (90 BASE) MCG/ACT Aero Soln inhalation aerosol         • [DISCONTINUED] acetaminophen/caffeine/butalbital 300-40-50 mg (FIORICET) -40 MG Cap capsule         • [DISCONTINUED] sertraline (ZOLOFT) 100 MG Tab Take 50 mg by mouth every day.       • ibuprofen (MOTRIN) 200 MG TABS Take 200 mg by mouth 2 times a day as needed.          No facility-administered encounter medications on file as of 4/24/2018.             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   HEENT: Normocephalic atraumatic, neck supple, no JVD noted, no masses noted, no meningeal signs noted  Lymphadenopathy: no cervical, supraclavicular, or inguinal lymphadenopathy noted  Cardiovascular: Intact distal pulses, including at wrists and ankles, no limb swelling noted  Pulmonary: No tachypnea noted, no accessory muscle use noted, no dyspnea noted  Abdominal: Soft, nontender, exhibits no distension, no peritoneal signs, no HSM  Musculoskeletal:   Right shoulder: exhibits only mild tenderness. Minimal pain with range of motion testing  Left shoulder: exhibits only mild tenderness. Minimal pain with range of motion testing  Right hip: exhibits mild tenderness. Minimal pain with range of motion testing  Left hip: exhibits mild tenderness. Mild pain with range of motion testing  Cervical back: exhibits mild decreased range of motion, mild tenderness and mild pain. Spurling's testing produces mild axial pain, trigger points noted  Thoracic: Tender with palpation mid thoracic region, trigger points noted, pain with range of motion testing  Lumbar back: exhibits decreased range of motion,  tenderness and  pain. straight leg testing produces posterior pelvic and thigh pain on the left, trigger points noted  Wrist/hand: mild pain with range of motion testing, negative tinel's at wrist, negative tinel's at elbows  Neurological: oriented to person, place, and time. Cranial nerves grossly intact, normal strength. Sensation intact distally. Reflexes 1+ in upper and lower limbs, Gait antalgic, reciprocal, No upper motor neuron signs evident  Skin: Skin is intact. no  rashes or lesions noted  Psychiatric: normal mood and affect. speech is normal and behavior is normal. Judgment and thought content normal. Cognition and memory are normal.       Assessment:      1. Lumbar radiculitis    2. Myofascial pain, cervical and thoracic spine      Plan:      1. Left lumbar 5 and sacral 1 transforaminal epidural steroid injections    2. Trigger point injections, cervical and thoracic spine

## 2018-04-24 NOTE — PROGRESS NOTES
SUBJECTIVE:   Ms. Taylor returns to the office today for followup evaluation of spinal/joint/musculoskeletal pain.    I last saw the patient in 9/2017, reviewed intercurrent medical and social issues.    Regarding today's visit:    The patient notes recent flare of lumbosacral pain, possibly associated with some activities in preparation for travel.    She now notes ongoing pain in the lumbosacral region, left greater than right, with radiating pain into the left lower limb with neuropathic component, worse with activities. The patient had prior lumbar epidural steroid injection in 4/2016, with benefit.    The patient notes flare of mid back pain, without overt radicular component. She has had benefit with prior trigger point injections.    The patient notes flare of neck pain, primarily in the left mid and lower cervical region, without overt radicular component.  She has had benefit with prior injection therapy, including trigger point injections as well as cervical facet blocks in 3/2017.    She notes intermittent hip area pain, primarily in the left.    She notes intermittent shoulder area pain    She notes history of left foot/ankle area pain, seen by podiatrist, notes trial with oral steroid, injection, bracing, orthotics/footwear. I reviewed prior podiatry records.      The patient has prior bilateral carpal tunnel release. The patient has history of right small finger laceration outside the area 8/2016, underwent right small finger radial digital nerve repair 8/17/2016, healed/resolved, with care per orthopedics    She notes intermittent headaches, relatively controlled.    She notes intermittent difficulty with sleep.    Mental health screen, reviewed PHQ-2 in Epic, note history of anxiety/depression, controlled. Opioid risk assessment, reviewed in Epic, no history of substance use/abuse noted.    She has had prior treatment including medications,  including NSAIDs. She has been to physical therapy. She has tried acupuncture, chiropractics, and massage therapy, transiently palliative.   She has had prior injections, with benefit. She denies bowel/bladder dysfunction. She denies overt limb weakness. She denies cardiopulmonary symptomatology. She is making an effort with her home exercise program. The flare pain limits her ability to function.      MEDICAL RECORDS REVIEW/DATA/REVIEW OF SYSTEMS:   Reviewed in epic.    I reviewed medications:  Previously tapered off of pain medication. Uses NSAIDs as needed, inquiring about trial with different muscle relaxant. Note prior trial, not effective or tolerated: Gabapentin, prozac, baclofen, robaxin, tizanidine, xanax.    I reviewed  profile 4/24/2018, consistent.        I reviewed diagnostic studies.     Reviewed radiographs:     Reviewed MRI lumbar spine 4/2013, showed multilevel degenerative changes, disc protrusion and foraminal stenosis greatest at left L5-S1. Lumbar spine x-rays 4/2013 show degenerative changes, no instability.     Reviewed MRI cervical spine 1/2017. Cervical spine x-rays 1/2014 showed mild degenerative changes, no instability.     MRI thoracic spine 10/2016 showed multilevel disc protrusions and degenerative changes, no intrinsic cord changes. Reviewed chest CT 4/2016. Reviewed chest x-ray 3/2016.     Left shoulder x-rays 12/2016 showed  ac arthritis. Left hip x-rays 2/2013 were unremarkable.      Head CT 5/2014 was unremarkable.     Reviewed laboratory studies. Reviewed the labs 3/2017, including CMP, rheumatologic screen unremarkable. Reviewed TFTs 1/2016. A1C 3/2015 of 5.8.  Reviewed urine drug screen for 9/2017, consistent.    I reviewed medical issues. Followed by primary care provider. Dermatology consulted regarding skin lesions.  Followed by ENT, notes sinus disease. Followed by GI, including regarding IBS. The patient notes she is perimenopausal, care per gynecology.      Review of  systems: No fevers or chills noted. No cranial nerve symptoms are denoted. See history, otherwise review of systems unremarkable.     Family history: No changes noted. No neuromuscular disorder is noted.    I reviewed social issues. 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resort, seasonal, leaving in early 5/2018 for summer      PAST MEDICAL HISTORY:   Past Medical History:   Diagnosis Date   • Anxiety    • Asthma     once with an allergic reaction   • Back pain    • Depression    • Headache    • Hypertension     with pain   • Skin lesions        PAST SURGICAL HISTORY:    Past Surgical History:   Procedure Laterality Date   • NERVE REPAIR Right 8/17/2016    Procedure: NERVE REPAIR RADIAL DIGITAL SMALL FINGER;  Surgeon: Simone Mendoza M.D.;  Location: SURGERY [facility];  Service:    • CARPAL TUNNEL RELEASE      left   • CARPAL TUNNEL RELEASE      right   • KNEE RECONSTRUCTION      Right ACL       ALLERGIES:  Codeine    MEDICATIONS:    Outpatient Encounter Prescriptions as of 4/24/2018   Medication Sig Dispense Refill   • DULoxetine (CYMBALTA) 20 MG Cap DR Particles Take 20 mg by mouth every day.     • amLODIPine (NORVASC) 2.5 MG Tab Take 2.5 mg by mouth every day.     • oxyCODONE-acetaminophen (PERCOCET-10)  MG Tab Take 1 Tab by mouth 2 times a day as needed for Severe Pain for up to 5 days. 10 Tab 0   • metaxalone (SKELAXIN) 800 MG Tab Take 1 Tab by mouth 3 times a day. as needed for muscle spasm 20 Tab 1   • XIFAXAN 550 MG Tab tablet      • fluticasone (FLONASE) 50 MCG/ACT nasal spray Spray 1 Spray in nose every day.     • EVAMIST 1.53 MG/SPRAY SOLN Apply 1 Spray to affected area(s) every day.     • progesterone (PROMETRIUM) 200 MG capsule Take 200 mg by mouth every day.     • [DISCONTINUED] cefUROXime (CEFTIN) 250 MG Tab      • [DISCONTINUED] oxycodone-acetaminophen (PERCOCET-10)  MG Tab Take 1 tablet by mouth three times per day as needed for severe pain. 20 Tab 0   • lidocaine  (LIDODERM) 5 % Patch Apply 1 Patch to skin as directed every 24 hours. as needed for nerve pain 30 Patch 5   • trazodone (DESYREL) 50 MG Tab Take 1/2  to 1 tablet by mouth at bedtime as needed for insomnia 30 Tab 5   • acyclovir (ZOVIRAX) 400 MG tablet      • [DISCONTINUED] Diclofenac Sodium 1 % Gel APPLY GEL TOPICALLY 3-4 TIMES DAILY RO LEFT FOOT FOR PAIN  0   • fluocinonide (LIDEX) 0.05 % Cream      • Levocetirizine Dihydrochloride 5 MG Tab      • diphenhydrAMINE (BENADRYL) 25 MG Tab Take 25 mg by mouth 2 times a day as needed for Sleep.     • [DISCONTINUED] PROVENTIL  (90 BASE) MCG/ACT Aero Soln inhalation aerosol      • [DISCONTINUED] acetaminophen/caffeine/butalbital 300-40-50 mg (FIORICET) -40 MG Cap capsule      • [DISCONTINUED] sertraline (ZOLOFT) 100 MG Tab Take 50 mg by mouth every day.     • ibuprofen (MOTRIN) 200 MG TABS Take 200 mg by mouth 2 times a day as needed.       No facility-administered encounter medications on file as of 4/24/2018.        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   HEENT: Normocephalic atraumatic, neck supple, no JVD noted, no masses noted, no meningeal signs noted  Lymphadenopathy: no cervical, supraclavicular, or inguinal lymphadenopathy noted  Cardiovascular: Intact distal pulses, including at wrists and ankles, no limb swelling noted  Pulmonary: No tachypnea noted, no accessory muscle use noted, no dyspnea noted  Abdominal: Soft, nontender, exhibits no distension, no peritoneal signs, no HSM  Musculoskeletal:   Right shoulder: exhibits only mild tenderness. Minimal pain with range of motion testing  Left shoulder: exhibits only mild tenderness. Minimal pain with range of motion testing  Right hip: exhibits mild tenderness. Minimal pain with range of motion testing  Left hip: exhibits mild tenderness. Mild pain with range of motion testing  Cervical back: exhibits mild decreased range of motion, mild tenderness and mild pain. Spurling's testing produces mild axial pain, trigger points noted  Thoracic: Tender with palpation mid thoracic region, trigger points noted, pain with range of motion testing  Lumbar back: exhibits decreased range of motion,  tenderness and  pain. straight leg testing produces posterior pelvic and thigh pain on the left, trigger points noted  Wrist/hand: mild pain with range of motion testing, negative tinel's at wrist, negative tinel's at elbows  Neurological: oriented to person, place, and time. Cranial nerves grossly intact, normal strength. Sensation intact distally. Reflexes 1+ in upper and lower limbs, Gait antalgic, reciprocal, No upper motor neuron signs evident  Skin: Skin is intact. no rashes or lesions noted  Psychiatric: normal mood and affect. speech is normal and behavior is normal. Judgment and thought content normal. Cognition and memory are normal.                      ASSESSMENT:    1. Flare of low back pain, myofascial pain, lumbar radiculitis, lumbar disc protrusion, degenerative disc disease, lumbar spondylosis,  facet syndrome  2. Flare of neck pain, myofascial pain, cervical radiculitis (controlled), degenerative disc disease, cervical spondylosis, foraminal stenosis, cervical facet syndrome  3. Flare of mid-back pain, myofascial pain, disc protrusion, degenerative disc disease, thoracic spondylosis, facet syndrome   4. Left shoulder area pain, sprain strain, ac arthritis, consider rotator cuff disorder, controlled  5. Left hip area/ischial tuberosity area pain, myofascial pain, hamstring strain, controlled  6. Status post right carpal tunnel release 8/2014, remote left carpal release  7. Left foot/ankle pain, sprain strain, plantar fasciitis, calcaneal spur, pes cavus, with care per podiatry, symptomatically controlled  8. Insomnia  9. Pain medication agreement discussed  10. Comorbid medical issues, including irritable bowel syndrome, skin lesions, with care per primary care provider and consultants      DISCUSSION/PLAN:  1. I discussed management options. I reviewed symptomatic care.  2. Reviewed injection therapy with left L5 and S1 transforaminal epidural steroid injections, as well as cervical and thoracic trigger point injections, perform at procedure center. Patient has received benefit with these injections in the past  3. Ordered updated CMP and urine drug screen as part of program  4. I reviewed therapy. I discussed efforts with home exercise program and activity modification. Right-hand activity/restrictions per orthopedics  5. The patient can consider trials with acupuncture, massage therapy, or TENS unit  6. I reviewed sleep hygiene  7. I reviewed headaches/migraine triggers.  8. I reviewed medication monitoring. I reviewed pain medication dosing, reviewed risks and alternatives. I reviewed medication adjustments, as current pain not adequately controlled. I added Skelaxin of the trial. I wrote a new prescription for Percocet 10/325 1 tablet by mouth 2 times per day as needed for severe pain. #10, refill  zero, five-day supply. I counseled the patient regarding risks, side effects, and interactions of medications, including over-the-counter medications. I reviewed tylenol/acetaminophen dosing. I reviewed serotonin syndrome risk. I reviewed bowel management program. I advised the patient to avoid sudafed/pseudoephedrine type medication and herbs/supplements. I recommend avoid use of benzodiazepines due to the risk of interaction with pain medication. I advise the patient to avoid alcohol use. I counseled the patient on the controlled substance prescribing program. I reviewed further symptomatic medications.      In prescribing controlled substances to this patient, I certify that I have obtained and reviewed the medical history of Daylin Taylor. I have also made a good davie effort to obtain applicable records from other providers who have treated the patient and no other records are available at this time.     I have conducted a physical exam and documented it. I have reviewed Ms. Taylor’s prescription history as maintained by the [place] Prescription Monitoring Program.     I have assessed the patient’s risk for abuse, dependency, and addiction using the validated Opioid Risk Tool available at https://www.mdcalc.com/pvzzza-zlzc-ckuk-ort-narcotic-abuse.     Given the above, I believe the benefits of controlled substance therapy outweigh the risks. The reasons for prescribing controlled substances include non-narcotic, oral analgesic alternatives have been inadequate for pain control. Accordingly, I have discussed the risk and benefits, treatment plan, and alternative therapies with the patient.       9. I reviewed additional diagnostic options, including further/advanced imaging, electrodiagnostic testing, vascular studies, and further lab screen  10. I reviewed additional therapeutic options, including further injection/interventional therapy and additional consultative input  11. I will plan on seeing  the patient back in the procedure center for the above-noted interventions or in the office where we can further review management options      Please note that this dictation was created using voice recognition software. I have made every reasonable attempt to correct obvious errors but there may be errors of grammar and content that I may have overlooked prior to finalization of this note.

## 2018-04-25 LAB

## 2018-05-01 ENCOUNTER — HOSPITAL ENCOUNTER (OUTPATIENT)
Dept: PAIN MANAGEMENT | Facility: REHABILITATION | Age: 53
End: 2018-05-01
Attending: PHYSICAL MEDICINE & REHABILITATION
Payer: COMMERCIAL

## 2018-05-01 ENCOUNTER — HOSPITAL ENCOUNTER (OUTPATIENT)
Dept: RADIOLOGY | Facility: REHABILITATION | Age: 53
End: 2018-05-01
Attending: PHYSICAL MEDICINE & REHABILITATION
Payer: COMMERCIAL

## 2018-05-01 VITALS
RESPIRATION RATE: 16 BRPM | HEART RATE: 68 BPM | SYSTOLIC BLOOD PRESSURE: 143 MMHG | WEIGHT: 138.45 LBS | DIASTOLIC BLOOD PRESSURE: 98 MMHG | TEMPERATURE: 98.9 F | HEIGHT: 64 IN | OXYGEN SATURATION: 95 % | BODY MASS INDEX: 23.64 KG/M2

## 2018-05-01 PROCEDURE — 700117 HCHG RX CONTRAST REV CODE 255

## 2018-05-01 PROCEDURE — 700111 HCHG RX REV CODE 636 W/ 250 OVERRIDE (IP)

## 2018-05-01 PROCEDURE — 20553 NJX 1/MLT TRIGGER POINTS 3/>: CPT

## 2018-05-01 PROCEDURE — 20552 NJX 1/MLT TRIGGER POINT 1/2: CPT

## 2018-05-01 PROCEDURE — 64483 NJX AA&/STRD TFRM EPI L/S 1: CPT

## 2018-05-01 RX ORDER — DEXAMETHASONE SODIUM PHOSPHATE 10 MG/ML
INJECTION, SOLUTION INTRAMUSCULAR; INTRAVENOUS
Status: COMPLETED
Start: 2018-05-01 | End: 2018-05-01

## 2018-05-01 RX ORDER — METHYLPREDNISOLONE ACETATE 80 MG/ML
INJECTION, SUSPENSION INTRA-ARTICULAR; INTRALESIONAL; INTRAMUSCULAR; SOFT TISSUE
Status: COMPLETED
Start: 2018-05-01 | End: 2018-05-01

## 2018-05-01 RX ORDER — LIDOCAINE HYDROCHLORIDE 10 MG/ML
INJECTION, SOLUTION EPIDURAL; INFILTRATION; INTRACAUDAL; PERINEURAL
Status: COMPLETED
Start: 2018-05-01 | End: 2018-05-01

## 2018-05-01 RX ORDER — TRIAMCINOLONE ACETONIDE 40 MG/ML
INJECTION, SUSPENSION INTRA-ARTICULAR; INTRAMUSCULAR
Status: COMPLETED
Start: 2018-05-01 | End: 2018-05-01

## 2018-05-01 RX ORDER — MIDAZOLAM HYDROCHLORIDE 1 MG/ML
INJECTION INTRAMUSCULAR; INTRAVENOUS
Status: COMPLETED
Start: 2018-05-01 | End: 2018-05-01

## 2018-05-01 RX ORDER — METHYLPREDNISOLONE ACETATE 40 MG/ML
INJECTION, SUSPENSION INTRA-ARTICULAR; INTRALESIONAL; INTRAMUSCULAR; SOFT TISSUE
Status: COMPLETED
Start: 2018-05-01 | End: 2018-05-01

## 2018-05-01 RX ADMIN — LIDOCAINE HYDROCHLORIDE 5 ML: 10 INJECTION, SOLUTION EPIDURAL; INFILTRATION; INTRACAUDAL; PERINEURAL at 14:52

## 2018-05-01 RX ADMIN — IOHEXOL 1 ML: 240 INJECTION, SOLUTION INTRATHECAL; INTRAVASCULAR; INTRAVENOUS; ORAL at 14:56

## 2018-05-01 RX ADMIN — METHYLPREDNISOLONE ACETATE 40 MG: 40 INJECTION, SUSPENSION INTRA-ARTICULAR; INTRALESIONAL; INTRAMUSCULAR; SOFT TISSUE at 14:56

## 2018-05-01 RX ADMIN — METHYLPREDNISOLONE ACETATE 80 MG: 80 INJECTION, SUSPENSION INTRA-ARTICULAR; INTRALESIONAL; INTRAMUSCULAR; SOFT TISSUE at 14:56

## 2018-05-01 RX ADMIN — LIDOCAINE HYDROCHLORIDE 10 ML: 10 INJECTION, SOLUTION EPIDURAL; INFILTRATION; INTRACAUDAL; PERINEURAL at 14:52

## 2018-05-01 ASSESSMENT — PAIN SCALES - GENERAL
PAINLEVEL_OUTOF10: 4
PAINLEVEL_OUTOF10: 1

## 2018-05-01 NOTE — PROGRESS NOTES
Medication reconciliation reviewed with patient. Denied taking any blood thinners and any  any anti- inflammatories medications.  She's been off  Motrin for one  week..Patient had a  Chris / spouse .Home care form and verbal  instruction given to patient and verbalized understanding. Dr. High made aware & spoke to the patient. . Hand off reported to EDILIA Au RN.

## 2018-05-01 NOTE — PROGRESS NOTES
.Fluids tolerated well. Ice compress applied to the affected area. Reviewed home care instructions and understood by patient.Dr. High evaluated patient.

## 2018-05-02 ENCOUNTER — TELEPHONE (OUTPATIENT)
Dept: PHYSICAL MEDICINE AND REHAB | Facility: MEDICAL CENTER | Age: 53
End: 2018-05-02

## 2018-05-02 NOTE — PROCEDURES
DATE OF SERVICE:  05/01/2018    DIAGNOSES:  1.  Lumbosacral radiculitis.  2.  Myofascial pain, thoracic spine.    PROCEDURES:  1.  Left lumbar 5 and sacral 1 transforaminal epidural steroid injections.  2.  Trigger point injections, thoracic spine.    PROCEDURE NOTE:  Informed consent was obtained.  Risks, benefits and   alternatives discussed and all questions answered.  The patient was placed   prone on the fluoroscopy table.  The skin area from T12-S4 was prepared in a   sterile manner with povidone-iodine.  The patient was monitored throughout the   procedure.    Fluoroscopy was turned on and positioned to identify the left L5-S1 and S1   foramen.  Those areas were locally anesthetized with 2 mL of 1%   preservative-free lidocaine.  Using fluoroscopy, 22-gauge spinal needles were   inserted and advanced into the foramen, confirmed on multiplanar imaging.    Then, 1-2 mL of Iohexol dye was injected into the area allowing visualization   of the selective nerve root and epidural space.    Following negative aspiration, 3 mL of mixture of 120 mg of methylprednisolone   and 4 mL of 1% preservative-free lidocaine was injected at each level.  At   the L5 and S1 levels, the patient's pain was reproduced.  A total of 2 spinal   needles were used for the injections.  There were no complications.    Following recovery, trigger points were identified, 3 total in the right mid   and lower thoracic paraspinal muscles.  The areas were marked and then   sterilely prepared.  Then, using a 25-gauge 1 to 1-1/2 inch needle, trigger   point injections were performed with injection of 1 mL of a mixture of 1 mL of   Kenalog 40 mg/mL and 2 mL of 1% lidocaine at each site.  The patient   tolerated the procedure well.  There were no complications.    The patient was transferred to recovery.  Postinjection instructions were reviewed  with the patient.  Then, the patient was discharged to home in the care of a   friend and/or family  .       ____________________________________     MD BARTOLOME TUCKER / STANLEY    DD:  05/01/2018 15:14:51  DT:  05/02/2018 03:04:09    D#:  [id number]  Job#:  [id number]

## 2018-05-09 ENCOUNTER — HOSPITAL ENCOUNTER (OUTPATIENT)
Facility: MEDICAL CENTER | Age: 53
End: 2018-05-09
Attending: FAMILY MEDICINE
Payer: COMMERCIAL

## 2018-05-09 LAB
C DIFF DNA SPEC QL NAA+PROBE: NEGATIVE
C DIFF TOX GENS STL QL NAA+PROBE: NEGATIVE

## 2018-05-09 PROCEDURE — 87493 C DIFF AMPLIFIED PROBE: CPT

## 2018-05-09 PROCEDURE — 89125 SPECIMEN FAT STAIN: CPT

## 2018-05-10 LAB
BODY FLD TYPE: NORMAL
FAT FLD QL: NORMAL

## 2019-02-07 ENCOUNTER — OFFICE VISIT (OUTPATIENT)
Dept: PHYSICAL MEDICINE AND REHAB | Facility: MEDICAL CENTER | Age: 54
End: 2019-02-07
Payer: COMMERCIAL

## 2019-02-07 VITALS
BODY MASS INDEX: 23.71 KG/M2 | OXYGEN SATURATION: 98 % | TEMPERATURE: 98.2 F | SYSTOLIC BLOOD PRESSURE: 124 MMHG | WEIGHT: 138.89 LBS | HEART RATE: 67 BPM | DIASTOLIC BLOOD PRESSURE: 82 MMHG | HEIGHT: 64 IN

## 2019-02-07 DIAGNOSIS — M53.9 ACUTE LOW BACK PAIN DUE TO SPINAL DISORDER: ICD-10-CM

## 2019-02-07 DIAGNOSIS — M54.9 ACUTE MID BACK PAIN: ICD-10-CM

## 2019-02-07 DIAGNOSIS — M51.36 OTHER INTERVERTEBRAL DISC DEGENERATION, LUMBAR REGION: ICD-10-CM

## 2019-02-07 DIAGNOSIS — Z71.89 PAIN MEDICATION AGREEMENT DISCUSSED: ICD-10-CM

## 2019-02-07 DIAGNOSIS — M62.838 MUSCLE SPASM: ICD-10-CM

## 2019-02-07 DIAGNOSIS — M54.16 LUMBAR RADICULITIS: ICD-10-CM

## 2019-02-07 DIAGNOSIS — M54.10 RADICULITIS: ICD-10-CM

## 2019-02-07 DIAGNOSIS — M47.816 LUMBAR SPONDYLOSIS: ICD-10-CM

## 2019-02-07 DIAGNOSIS — M54.2 ACUTE NECK PAIN: ICD-10-CM

## 2019-02-07 DIAGNOSIS — M79.18 MYOFASCIAL PAIN: ICD-10-CM

## 2019-02-07 DIAGNOSIS — M54.50 LUMBOSACRAL PAIN: ICD-10-CM

## 2019-02-07 DIAGNOSIS — M54.50 ACUTE LOW BACK PAIN DUE TO SPINAL DISORDER: ICD-10-CM

## 2019-02-07 DIAGNOSIS — M54.2 NECK PAIN: ICD-10-CM

## 2019-02-07 PROCEDURE — 99214 OFFICE O/P EST MOD 30 MIN: CPT | Mod: 25 | Performed by: PHYSICAL MEDICINE & REHABILITATION

## 2019-02-07 PROCEDURE — 20552 NJX 1/MLT TRIGGER POINT 1/2: CPT | Performed by: PHYSICAL MEDICINE & REHABILITATION

## 2019-02-07 RX ORDER — METAXALONE 800 MG/1
800 TABLET ORAL 3 TIMES DAILY
Qty: 20 TAB | Refills: 1 | Status: SHIPPED | OUTPATIENT
Start: 2019-02-07 | End: 2020-02-13

## 2019-02-07 RX ORDER — OXYCODONE AND ACETAMINOPHEN 10; 325 MG/1; MG/1
1 TABLET ORAL 3 TIMES DAILY PRN
Qty: 15 TAB | Refills: 0 | Status: SHIPPED | OUTPATIENT
Start: 2019-02-07 | End: 2019-02-25 | Stop reason: SDUPTHER

## 2019-02-07 RX ORDER — METHYLPREDNISOLONE ACETATE 80 MG/ML
80 INJECTION, SUSPENSION INTRA-ARTICULAR; INTRALESIONAL; INTRAMUSCULAR; SOFT TISSUE ONCE
Status: COMPLETED | OUTPATIENT
Start: 2019-02-07 | End: 2019-02-07

## 2019-02-07 RX ADMIN — METHYLPREDNISOLONE ACETATE 80 MG: 80 INJECTION, SUSPENSION INTRA-ARTICULAR; INTRALESIONAL; INTRAMUSCULAR; SOFT TISSUE at 16:11

## 2019-02-07 ASSESSMENT — PATIENT HEALTH QUESTIONNAIRE - PHQ9: CLINICAL INTERPRETATION OF PHQ2 SCORE: 0

## 2019-02-07 NOTE — PROGRESS NOTES
SUBJECTIVE:   Ms. Taylor returns to the office today for followup evaluation of spinal/joint/musculoskeletal pain.    I last saw the patient in 5/2018, reviewed intercurrent medical and social issues.    Regarding today's visit:    Since I last saw her, the patient notes a flare of pain, possibly associated with some outside travel as well as physical activities.    She now notes ongoing pain in the lumbosacral region, right greater than left, also notes right lower limb radiating pain, and the L5-S1 distribution, worse with activities, limiting her ability to function.  She has had benefit with prior injection therapy for pain flares.    The patient notes flare of neck pain, left greater than right, also notes intermittent left upper limb radiation.  The patient has had benefit with prior injection therapy for pain flares    The patient notes intermittent right hip area pain, relatively controlled    The patient notes intermittent shoulder area pain, relatively controlled    The patient notes joint/musculoskeletal pain, notes pain flares, primarily activity associated.    She notes history of left foot/ankle area pain, seen by podiatrist, notes trial with oral steroid, injection, bracing, orthotics/footwear. I reviewed prior podiatry records.      The patient has prior bilateral carpal tunnel release. The patient has history of right small finger laceration outside the area 8/2016, underwent right small finger radial digital nerve repair 8/17/2016, healed/resolved, with care per orthopedics    She notes intermittent headaches, relatively controlled.    She notes intermittent difficulty with sleep.    Mental health screen, reviewed PHQ-2 in Epic. Opioid risk assessment, reviewed in Epic.    She has had prior treatment including medications, including NSAIDs. She has been to physical therapy. She has tried acupuncture, chiropractics, and massage therapy,  transiently palliative.   She has had prior injections, with benefit. She denies bowel/bladder dysfunction. She denies overt limb weakness. She denies cardiopulmonary symptomatology. She is making an effort with her home exercise program.  The flare of pain is limiting her ability to function.  She is inquiring about additional treatment options.      MEDICAL RECORDS REVIEW/DATA/REVIEW OF SYSTEMS:   Reviewed in epic.    I reviewed medications: Tried NSAIDs.  Tolerated prior use of Skelaxin.  Notes prior use of Percocet for pain flare, tolerated, with benefit.  Note prior trial, not effective or tolerated: Gabapentin, prozac, baclofen, robaxin, tizanidine, xanax.    I reviewed  profile 2/7/2019, consistent.        I reviewed diagnostic studies.     Reviewed radiographs:     Reviewed MRI lumbar spine 4/2013, showed multilevel degenerative changes, disc protrusion and foraminal stenosis greatest at left L5-S1. Lumbar spine x-rays 4/2013 show degenerative changes, no instability.     Reviewed MRI cervical spine 1/2017. Cervical spine x-rays 1/2014 showed mild degenerative changes, no instability.     MRI thoracic spine 10/2016 showed multilevel disc protrusions and degenerative changes, no intrinsic cord changes. Reviewed chest CT 4/2016. Reviewed chest x-ray 3/2016.     Left shoulder x-rays 12/2016 showed  ac arthritis. Left hip x-rays 2/2013 were unremarkable.      Head CT 5/2014 was unremarkable.     Reviewed laboratory studies.  Reviewed labs 4/2018, included CMP, CBC, A1C 6, TFTs, vitamin D mildly low.  Reviewed labs 5/2018, including CRP, normal.  Reviewed urine drug screen for 4/2018    I reviewed medical issues. Followed by primary care provider. Dermatology consulted regarding skin lesions.  Followed by ENT, notes sinus disease. Followed by GI, including regarding IBS. The patient notes she is perimenopausal, care per gynecology.      Review of systems: Reviewed, no changes noted.  No fevers or chills noted.  No cranial nerve symptoms are denoted. See history, otherwise review of systems unremarkable.     Family history: No changes noted. No neuromuscular disorder is noted.    I reviewed social issues.       PAST MEDICAL HISTORY:   Past Medical History:   Diagnosis Date   • Anxiety    • Asthma     once with an allergic reaction   • Back pain    • Depression    • Headache    • Hypertension     with pain   • Skin lesions        PAST SURGICAL HISTORY:    Past Surgical History:   Procedure Laterality Date   • NERVE REPAIR Right 8/17/2016    Procedure: NERVE REPAIR RADIAL DIGITAL SMALL FINGER;  Surgeon: Simone Mendoza M.D.;  Location: SURGERY [facility];  Service:    • CARPAL TUNNEL RELEASE      left   • CARPAL TUNNEL RELEASE      right   • KNEE RECONSTRUCTION      Right ACL       ALLERGIES:  Codeine    MEDICATIONS:    Outpatient Encounter Prescriptions as of 2/7/2019   Medication Sig Dispense Refill   • Estradiol (ESTROGEL) 0.75 MG/1.25 GM (0.06%) Gel Apply  to skin as directed.     • oxyCODONE-acetaminophen (PERCOCET-10)  MG Tab Take 1 Tab by mouth 3 times a day as needed for Severe Pain for up to 5 days. 15 Tab 0   • metaxalone (SKELAXIN) 800 MG Tab Take 1 Tab by mouth 3 times a day. as needed for muscle spasm 20 Tab 1   • amLODIPine (NORVASC) 2.5 MG Tab Take 2.5 mg by mouth every day.     • progesterone (PROMETRIUM) 200 MG capsule Take 200 mg by mouth every day.     • DULoxetine (CYMBALTA) 20 MG Cap DR Particles Take 20 mg by mouth every day.     • [DISCONTINUED] metaxalone (SKELAXIN) 800 MG Tab Take 1 Tab by mouth 3 times a day. as needed for muscle spasm 20 Tab 1   • lidocaine (LIDODERM) 5 % Patch Apply 1 Patch to skin as directed every 24 hours. as needed for nerve pain 30 Patch 5   • trazodone (DESYREL) 50 MG Tab Take 1/2  to 1 tablet by mouth at bedtime as needed for insomnia 30 Tab 5   • XIFAXAN 550 MG Tab tablet      • acyclovir (ZOVIRAX) 400 MG tablet      • fluocinonide (LIDEX) 0.05 % Cream       • Levocetirizine Dihydrochloride 5 MG Tab      • diphenhydrAMINE (BENADRYL) 25 MG Tab Take 25 mg by mouth 2 times a day as needed for Sleep.     • fluticasone (FLONASE) 50 MCG/ACT nasal spray Spray 1 Spray in nose every day.     • EVAMIST 1.53 MG/SPRAY SOLN Apply 1 Spray to affected area(s) every day.     • ibuprofen (MOTRIN) 200 MG TABS Take 200 mg by mouth 2 times a day as needed.       No facility-administered encounter medications on file as of 2/7/2019.        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    • Hobby Hazards No   • Sleep Concern Yes     does not sleep well at times    • Stress Concern Yes     yes stressed    • Weight Concern No   • Special Diet No   • Back Care No   • Exercise No     not able to walk because of foot pain    • Bike Helmet No     does not bike    • Seat Belt Yes   • Self-Exams Yes     Social History Narrative   • No narrative on file       Vital signs: Reviewed  Constitutional: Awake, alert, no acute distress, pain with transitions  HEENT: Normocephalic atraumatic, neck supple, no JVD noted,  no meningeal signs noted  Lymphadenopathy: no cervical, supraclavicular, or inguinal lymphadenopathy noted  Cardiovascular: Intact distal pulses, including at wrists and ankles, no limb swelling noted  Pulmonary: No tachypnea noted, no accessory muscle use noted, no dyspnea noted  Abdominal: Soft, nontender, exhibits no distension, no peritoneal signs, no HSM  Musculoskeletal:   Cervical: Tender with palpation left mid and lower cervical region, trigger points noted,  pain with range of motion testing, Spurling's testing produces axial pain on the left  Shoulder: Minimal tenderness palpation, minimal pain with range of motion testing  Thoracic: Minimal tenderness, minimal pain with range of motion testing  Lumbar: Tender with palpation right lumbosacral region, pain with range of motion testing, trigger points noted, straight leg testing produces posterior pelvic and thigh pain on the right  Hip: Mild tenderness with palpation about the right hip, only mild pain with range of motion test  Wrist/hand: No significant pain tenderness.  No significant pain with range of motion testing. Tinel's at wrist  Elbow: No significant tenderness.  No significant pain with range of motion testing. Tinel's at elbows  Neurological: oriented. Cranial nerves grossly intact, normal strength, non-focal.  Normal tone.  Sensation intact distally. Reflexes 1-2+ in upper and lower limbs, Gait steady, antalgic, reciprocal. No upper motor neuron signs evident  Skin: Skin is intact. no rashes or lesions noted  Psychiatric: normal mood and affect. speech is normal and behavior is normal.         Procedure note: Written/informed consent was obtained, risks benefits alternatives discussed, and all questions were answered.  My medical assistant, Kota Oliva, assisted with the procedure.  The patient was placed prone on the exam table and 3 trigger points were identified in the right lumbosacral region, lumbosacral extensor muscle group, and 3 trigger points were identified in the left mid and lower cervical region, cervical extensor muscle group.  The areas were marked, then sterilely prepared.  Then using a 25-gauge needle, trigger point injections were performed with injection of 1 cc of a mixture of 1 cc of Depo-Medrol 80 mg/cc and 5 cc of 1% lidocaine at each site.  The patient tolerated the procedure well.  There were no complications.      ASSESSMENT:    1.  Flare of lumbosacral pain, myofascial pain,  lumbar radiculitis, disc protrusion, degenerative disc disease, lumbar spondylosis, facet syndrome, suspect progression of pathology    - I reviewed postprocedure  - Ordered updated MRI lumbar spine without contrast as well as lumbar spine x-ray  - Referred the patient to physical therapy    2.  Flare of neck pain, myofascial pain, intermittent cervical radiculitis, degenerative disc disease, cervical spondylosis, foraminal stenosis, cervical facet syndrome    - I reviewed postprocedure precautions  - Referred the patient to physical therapy    3.  Mid back pain, myofascial pain, myofascial pain, disc protrusion, degenerative disc disease, thoracic spondylosis, facet syndrome, relatively symptomatically controlled    4.  Right hip pain, sprain strain, relatively controlled    5.  Joint/musculoskeletal pain    6. Left foot/ankle pain, sprain strain, plantar fasciitis, calcaneal spur, pes cavus, with care per podiatry    7.  Insomnia    - Review sleep hygiene    8.  Controlled substance agreement discussed    9.  Comorbid medical issues, with care per primary care provider    - I would be interested in reviewing most recent laboratory studies      DISCUSSION/PLAN:    - I discussed management options. I reviewed symptomatic care    - I reviewed home exercise program, with medical precautions    - The patient can consider complementary trials with acupuncture, superficial massage therapy, or TENS unit    - I reviewed medication monitoring.  I reviewed medication adjustments as current pain not adequately controlled.    - I wrote prescription for:    -Skelaxin 800 mg 1 tablet 3 times per day as needed for muscle spasms #20, refill 1    - Percocet 10/325 1 tablet 3 times per day as needed for severe pain for 5 days #15, refill 0, which the patient has used in the past, tolerated, with benefit    - Note, pain management under acute provisions    - I reviewed risks, side effects, and interactions of medications, including  NSAIDs and over-the-counter medications. I reviewed tylenol/acetaminophen dosing. I reviewed bowel management program.  I advise the patient to avoid sudafed/pseudoephedrine-type medication as well as herbs/supplements.I recommend avoid use of benzodiazepines due to risk of interaction with pain medication. I advise the patient to avoid alcohol use. I reviewed the controlled substance prescribing program. I reviewed further symptomatic medications.    In prescribing controlled substances to this patient, I certify that I have obtained and reviewed the medical history of Daylin Taylor. I have also made a good davie effort to obtain applicable records from other providers who have treated the patient and no other records are available at this time.     I have conducted a physical exam and documented it. I have reviewed Ms. Taylor’s prescription history as maintained by the [place] Prescription Monitoring Program.     I have assessed the patient’s risk for abuse, dependency, and addiction using the validated Opioid Risk Tool available at https://www.[employer].com/jblpjb-jjev-qfat-ort-narcotic-abuse.     Given the above, I believe the benefits of controlled substance therapy outweigh the risks. The reasons for prescribing controlled substances include non-narcotic, oral analgesic alternatives have been inadequate for pain control. Accordingly, I have discussed the risk and benefits, treatment plan, and alternative therapies with the patient.       - I reviewed additional diagnostic options, including further/advanced imaging, electrodiagnostic testing, vascular studies, and further lab screen    - I reviewed additional therapeutic options, including injection/interventional therapy and additional consultative input    - I reviewed psychosocial interventions    - Return after the above-noted diagnostic studies or an as-needed basis      Please note that this dictation was created using voice recognition  software. I have made every reasonable attempt to correct obvious errors but there may be errors of grammar and content that I may have overlooked prior to finalization of this note.

## 2019-02-25 ENCOUNTER — HOSPITAL ENCOUNTER (OUTPATIENT)
Dept: RADIOLOGY | Facility: MEDICAL CENTER | Age: 54
End: 2019-02-25
Attending: PHYSICAL MEDICINE & REHABILITATION
Payer: COMMERCIAL

## 2019-02-25 ENCOUNTER — OFFICE VISIT (OUTPATIENT)
Dept: PHYSICAL MEDICINE AND REHAB | Facility: MEDICAL CENTER | Age: 54
End: 2019-02-25
Payer: COMMERCIAL

## 2019-02-25 VITALS
TEMPERATURE: 97.7 F | WEIGHT: 136.47 LBS | HEART RATE: 76 BPM | HEIGHT: 64 IN | OXYGEN SATURATION: 96 % | BODY MASS INDEX: 23.3 KG/M2 | DIASTOLIC BLOOD PRESSURE: 78 MMHG | SYSTOLIC BLOOD PRESSURE: 118 MMHG

## 2019-02-25 DIAGNOSIS — M53.9 ACUTE LOW BACK PAIN DUE TO SPINAL DISORDER: ICD-10-CM

## 2019-02-25 DIAGNOSIS — M54.2 ACUTE NECK PAIN: ICD-10-CM

## 2019-02-25 DIAGNOSIS — M47.812 SPONDYLOSIS OF CERVICAL REGION WITHOUT MYELOPATHY OR RADICULOPATHY: ICD-10-CM

## 2019-02-25 DIAGNOSIS — M54.2 NECK PAIN: ICD-10-CM

## 2019-02-25 DIAGNOSIS — M54.50 LUMBOSACRAL PAIN: ICD-10-CM

## 2019-02-25 DIAGNOSIS — M54.12 CERVICAL RADICULITIS: ICD-10-CM

## 2019-02-25 DIAGNOSIS — M54.50 ACUTE LOW BACK PAIN DUE TO SPINAL DISORDER: ICD-10-CM

## 2019-02-25 DIAGNOSIS — M79.18 MYOFASCIAL PAIN: ICD-10-CM

## 2019-02-25 DIAGNOSIS — Z71.89 PAIN MEDICATION AGREEMENT DISCUSSED: ICD-10-CM

## 2019-02-25 DIAGNOSIS — M54.9 MID BACK PAIN: ICD-10-CM

## 2019-02-25 PROCEDURE — 99214 OFFICE O/P EST MOD 30 MIN: CPT | Performed by: PHYSICAL MEDICINE & REHABILITATION

## 2019-02-25 PROCEDURE — 72040 X-RAY EXAM NECK SPINE 2-3 VW: CPT

## 2019-02-25 RX ORDER — OXYCODONE AND ACETAMINOPHEN 10; 325 MG/1; MG/1
1 TABLET ORAL 2 TIMES DAILY PRN
Qty: 10 TAB | Refills: 0 | Status: SHIPPED | OUTPATIENT
Start: 2019-02-25 | End: 2019-03-02

## 2019-02-25 RX ORDER — PREDNISONE 10 MG/1
TABLET ORAL
COMMUNITY
Start: 2019-01-31 | End: 2019-02-25

## 2019-02-25 RX ORDER — BUTALBITAL, ACETAMINOPHEN AND CAFFEINE 300; 40; 50 MG/1; MG/1; MG/1
CAPSULE ORAL
COMMUNITY
Start: 2019-01-25 | End: 2020-02-13

## 2019-02-25 RX ORDER — AMLODIPINE BESYLATE AND BENAZEPRIL HYDROCHLORIDE 5; 20 MG/1; MG/1
CAPSULE ORAL
COMMUNITY
Start: 2019-01-25

## 2019-02-25 ASSESSMENT — PATIENT HEALTH QUESTIONNAIRE - PHQ9: CLINICAL INTERPRETATION OF PHQ2 SCORE: 0

## 2019-02-25 NOTE — PROGRESS NOTES
SUBJECTIVE:   Ms. Taylor returns to the office today for followup evaluation of spinal/joint/musculoskeletal pain.    Since I last saw her, the patient notes tolerating the trigger point injections, also started physical therapy, reviewed records, notes a change with her pain pattern.    She now notes pain most prominent the left mid and lower cervical region, also notes left upper limb radiating pain with neuropathic component, worse with activities, also worsen with changes with her head position.  The the pain limits her ability to function, including ADLs as well as sleep.  For pain flares, the patient has had benefit with prior injection therapy, including cervical facet blocks, performed most recently in 3/2017    The patient notes lumbosacral pain is now localized to the right lumbosacral region, notes only intermittent right lower limb radiation, relatively controlled     She notes intermittent mid back pain, without overt radicular component    She notes intermittent right hip area pain, relatively controlled    The patient notes intermittent shoulder area pain, relatively controlled    The patient notes joint/musculoskeletal pain, notes pain flares, primarily activity associated.    She notes history of left foot/ankle area pain, seen by podiatrist, notes trial with oral steroid, injection, bracing, orthotics/footwear. I reviewed prior podiatry records.      The patient has prior bilateral carpal tunnel release. The patient has history of right small finger laceration outside the area 8/2016, underwent right small finger radial digital nerve repair 8/17/2016, healed/resolved, with care per orthopedics    She notes intermittent headaches, relatively controlled.    She notes intermittent difficulty with sleep.    Mental health screen, reviewed PHQ-2 in Epic. Opioid risk assessment, reviewed in Epic.    She has had prior treatment including medications,  including NSAIDs. She has been to physical therapy, currently in retrial. She has tried acupuncture, chiropractics, and massage therapy, transiently palliative.   She has had prior injections, with benefit. She denies bowel/bladder dysfunction. She denies overt limb weakness. She denies cardiopulmonary symptomatology. She is making an effort with her home exercise program.  The ongoing pain limits her ability to function.  She is inquiring about additional treatment options per    The patient's  was present for the evaluation today      MEDICAL RECORDS REVIEW/DATA/REVIEW OF SYSTEMS:   Reviewed in epic.    I reviewed medications: Current pain not adequately controlled.  Previously tried NSAIDs.  Tolerating Skelaxin.  Notes no benefit with Lidoderm patch trial.  Used Percocet for pain flares, tolerated, with benefit.  Side effects controlled.  No aberrant behavior noted.  Note prior trial, not effective or tolerated: gabapentin, prozac, baclofen, robaxin, tizanidine, xanax.    I reviewed  profile 2/25/2019, consistent.        I reviewed diagnostic studies.     Reviewed radiographs:     Reviewed MRI cervical spine 1/2017. Cervical spine x-rays 1/2014 showed mild degenerative changes, no instability.   Reviewed MRI lumbar spine 4/2013, showed multilevel degenerative changes, disc protrusion and foraminal stenosis greatest at left L5-S1. Lumbar spine x-rays 4/2013 show degenerative changes, no instability.     MRI thoracic spine 10/2016 showed multilevel disc protrusions and degenerative changes, no intrinsic cord changes. Reviewed chest CT 4/2016. Reviewed chest x-ray 3/2016.     Left shoulder x-rays 12/2016 showed  ac arthritis. Left hip x-rays 2/2013 were unremarkable.      Head CT 5/2014 was unremarkable.     Reviewed laboratory studies.  Reviewed labs 11/2018, [facility], including CMP, CBC.  Reviewed labs 4/2018, included A1C 6, TFTs, vitamin D mildly low.  Reviewed labs 5/2018, including CRP, normal.   Reviewed urine drug screen for 4/2018    I reviewed medical issues. Followed by primary care provider. Dermatology consulted regarding skin lesions.  Followed by ENT, notes sinus disease. Followed by GI, including regarding IBS. The patient notes she is perimenopausal, care per gynecology.      Review of systems: Reviewed, no changes noted.  No fevers or chills noted. No cranial nerve symptoms are denoted. See history, otherwise review of systems unremarkable.     Family history: No changes noted. No neuromuscular disorder is noted.    I reviewed social issues.       PAST MEDICAL HISTORY:   Past Medical History:   Diagnosis Date   • Anxiety    • Asthma     once with an allergic reaction   • Back pain    • Depression    • Headache    • Hypertension     with pain   • Skin lesions        PAST SURGICAL HISTORY:    Past Surgical History:   Procedure Laterality Date   • NERVE REPAIR Right 8/17/2016    Procedure: NERVE REPAIR RADIAL DIGITAL SMALL FINGER;  Surgeon: Simone Mendoza M.D.;  Location: SURGERY [facility];  Service:    • CARPAL TUNNEL RELEASE      left   • CARPAL TUNNEL RELEASE      right   • KNEE RECONSTRUCTION      Right ACL       ALLERGIES:  Codeine    MEDICATIONS:    Outpatient Encounter Prescriptions as of 2/25/2019   Medication Sig Dispense Refill   • progesterone (PROMETRIUM) 100 MG Cap      • acetaminophen/caffeine/butalbital 300-40-50 mg (FIORICET) -40 MG Cap capsule      • amlodipine-benazepril (LOTREL) 5-20 MG per capsule      • oxyCODONE-acetaminophen (PERCOCET-10)  MG Tab Take 1 Tab by mouth 2 times a day as needed for Severe Pain for up to 5 days. 10 Tab 0   • Estradiol (ESTROGEL) 0.75 MG/1.25 GM (0.06%) Gel Apply  to skin as directed.     • metaxalone (SKELAXIN) 800 MG Tab Take 1 Tab by mouth 3 times a day. as needed for muscle spasm 20 Tab 1   • DULoxetine (CYMBALTA) 20 MG Cap DR Particles Take 20 mg by mouth every day.     • diphenhydrAMINE (BENADRYL) 25 MG Tab Take 25 mg by  mouth 2 times a day as needed for Sleep.     • mupirocin (BACTROBAN) 2 % Ointment      • PROAIR  (90 Base) MCG/ACT Aero Soln inhalation aerosol      • [DISCONTINUED] predniSONE (DELTASONE) 10 MG Tab      • [DISCONTINUED] amLODIPine (NORVASC) 2.5 MG Tab Take 2.5 mg by mouth every day.     • lidocaine (LIDODERM) 5 % Patch Apply 1 Patch to skin as directed every 24 hours. as needed for nerve pain 30 Patch 5   • [DISCONTINUED] trazodone (DESYREL) 50 MG Tab Take 1/2  to 1 tablet by mouth at bedtime as needed for insomnia 30 Tab 5   • XIFAXAN 550 MG Tab tablet      • acyclovir (ZOVIRAX) 400 MG tablet      • fluocinonide (LIDEX) 0.05 % Cream      • Levocetirizine Dihydrochloride 5 MG Tab      • fluticasone (FLONASE) 50 MCG/ACT nasal spray Spray 1 Spray in nose every day.     • EVAMIST 1.53 MG/SPRAY SOLN Apply 1 Spray to affected area(s) every day.     • [DISCONTINUED] progesterone (PROMETRIUM) 200 MG capsule Take 200 mg by mouth every day.     • ibuprofen (MOTRIN) 200 MG TABS Take 200 mg by mouth 2 times a day as needed.       No facility-administered encounter medications on file as of 2/25/2019.        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    • Hobby Hazards No   • Sleep Concern Yes     does not sleep well at times    • Stress Concern Yes     yes stressed    • Weight Concern No   • Special Diet No   • Back Care No   • Exercise No     not able to walk because of foot pain    • Bike Helmet No     does not bike    • Seat Belt Yes   • Self-Exams Yes     Social History Narrative   • No  narrative on file       Vital signs: Reviewed  Constitutional: Awake, alert, no acute distress  HEENT: Normocephalic atraumatic, neck supple, no JVD noted,  no meningeal signs noted  Lymphadenopathy: no cervical, supraclavicular, or inguinal lymphadenopathy noted  Cardiovascular: Intact distal pulses, including at wrists and ankles, no limb swelling noted  Pulmonary: No tachypnea noted, no accessory muscle use noted, no dyspnea noted  Abdominal: Soft, nontender, exhibits no distension, no peritoneal signs, no HSM  Musculoskeletal:   Cervical: Tender with palpation left mid and lower cervical region, pain with range of motion testing, decreased range of motion, Spurling's testing produces axial and shoulder pain on the left  Shoulder: Only mild tenderness, only mild pain with range of motion testing, negative impingement  Back: Tender with palpation right lumbosacral region, mild tenderness mid thoracic region, mild pain with range of motion testing, trigger points noted, negative straight leg testing  Hip: Only mild tenderness, only mild pain with range of motion testing  Wrist/hand: No significant pain tenderness.  No significant pain with range of motion testing. Tinel's at wrist  Elbow: No significant tenderness.  No significant pain with range of motion testing. Tinel's at elbows  Neurological: oriented. Cranial nerves grossly intact, normal strength, non-focal.  Normal tone.  Sensation intact distally. Reflexes 1-2+ in upper and lower limbs, Gait mildly antalgic, steady, reciprocal. Able to heel/toe walk, normal Romberg and tandem gait testing, no upper motor neuron signs evident  Skin: Skin is intact. no rashes or lesions noted  Psychiatric: normal mood and affect. speech is normal and behavior is normal.         ASSESSMENT:    1.  Flare of neck pain, myofascial pain, intermittent cervical radiculitis, degenerative disc disease, foraminal stenosis, facet arthropathy, cervical spondylosis, cervical facet  syndrome    - Ordered updated cervical spine x-rays  - Reviewed injection therapy with therapeutic left cervical 4, 5, 6, and 7 medial branch blocks, facet blocks    2.  Lumbosacral pain, intermittent lumbar radiculitis, disc protrusion, degenerative disc disease, lumbar spondylosis, facet syndrome, consider sacroiliac joint disorder, residual myofascial pain    - Reviewed injection therapy with trigger point injections to treat the residual myofascial component to the ongoing pain, perform at procedure center  - If lumbar radicular pain returns, the patient can obtain updated MRI lumbar spine without contrast as well as lumbar spine x-ray, previously ordered    3.  Mid back pain, disc protrusion, degenerative disc disease, spondylosis, facet syndrome, residual myofascial pain    - Reviewed injection therapy with trigger point injections to treat the residual myofascial component to the ongoing pain, perform at procedure center    4.  Right hip pain, sprain strain, relatively control    5.  Joint/musculoskeletal pain    6. Left foot/ankle pain, sprain strain, plantar fasciitis, calcaneal spur, pes cavus, with care per podiatry    7.  Insomnia    - Review sleep hygiene    8.  Medication management    - Pain medication/controlled substance consent discussed    9.  Comorbid medical issues, with care per primary care provider      DISCUSSION/PLAN:    - I discussed management options. I reviewed symptomatic care    - Continue with physical therapy.  I reviewed home exercise program, with medical precautions.  I reviewed activity modification    - The patient can consider complementary trials with acupuncture, superficial massage therapy, or TENS unit    - I reviewed medication monitoring.  I reviewed pain medication dosing, reviewed risks and alternatives.  I reviewed medication adjustments as current pain not adequately controlled.    - I wrote prescription for:    - Percocet 10/325 1 tablet twice per day as needed for  severe pain for 5 days #10, refill 0    - She can continue with Skelaxin, has active prescription    - Note, pain management under acute pain provisions    - I reviewed risks, side effects, and interactions of medications, including NSAIDs and over-the-counter medications. I reviewed tylenol/acetaminophen dosing. I reviewed bowel management program.  I advise the patient to avoid sudafed/pseudoephedrine-type medication as well as herbs/supplements.I recommend avoid use of benzodiazepines due to risk of interaction with pain medication. I advise the patient to avoid alcohol use. I reviewed the controlled substance prescribing program. I reviewed further symptomatic medications.      In prescribing controlled substances to this patient, I certify that I have obtained and reviewed the medical history of Daylin Taylor. I have also made a good davie effort to obtain applicable records from other providers who have treated the patient and no other records are available at this time.     I have conducted a physical exam and documented it. I have reviewed Ms. Taylor’s prescription history as maintained by the [place] Prescription Monitoring Program.     I have assessed the patient’s risk for abuse, dependency, and addiction using the validated Opioid Risk Tool available at https://www.mdcalc.com/cphtoz-wtdy-cmje-ort-narcotic-abuse.     Given the above, I believe the benefits of controlled substance therapy outweigh the risks. The reasons for prescribing controlled substances include non-narcotic, oral analgesic alternatives have been inadequate for pain control. Accordingly, I have discussed the risk and benefits, treatment plan, and alternative therapies with the patient.       - I reviewed additional diagnostic options, including further/advanced imaging, including updated MRI cervical spine, offered, electrodiagnostic testing, vascular studies, and further lab screen    - I reviewed additional therapeutic  options, including further injection/interventional therapy and additional consultative input    - I reviewed psychosocial interventions    - I will plan on seeing the patient back in the procedure center for the above-noted interventions or in the office where we can further review management options      Please note that this dictation was created using voice recognition software. I have made every reasonable attempt to correct obvious errors but there may be errors of grammar and content that I may have overlooked prior to finalization of this note.

## 2019-02-25 NOTE — PROGRESS NOTES
Special Procedures H&P:    HPI: Ms. Taylor notes ongoing pain most prominent the left mid and lower cervical region, also notes left   upper limb radiating pain with neuropathic component, worse with activities, also worsen with changes with   her head position.  The the pain limits her ability to function, including ADLs as well as sleep.  For pain flares, the patient has had benefit with prior injection therapy, including cervical facet blocks, performed most    recently in 3/2017. The patient notes lumbosacral pain is now localized to the right lumbosacral region, notes only intermittent right lower limb radiation, relatively controlled. She notes intermittent mid back pain, without overt radicular component. She has had prior treatment including medications, including NSAIDs. She has been to physical therapy, currently in retrial. She has tried acupuncture, chiropractics, and massage therapy, transiently palliative.   She has had prior injections, with benefit. She denies bowel/bladder dysfunction. She denies overt limb weakness. She denies cardiopulmonary symptomatology. She is making an effort with her home exercise program.  The ongoing pain limits her ability to function.  She is inquiring about additional treatment options      MEDICAL RECORDS REVIEW/DATA/REVIEW OF SYSTEMS:   Reviewed in epic.     I reviewed medications:        I reviewed diagnostic studies.      Reviewed radiographs:      Reviewed MRI cervical spine 1/2017. Cervical spine x-rays 1/2014 showed mild degenerative changes, no instability.     Reviewed MRI lumbar spine 4/2013, showed multilevel degenerative changes, disc protrusion and foraminal stenosis greatest at left L5-S1. Lumbar spine x-rays 4/2013 show degenerative changes, no instability.      MRI thoracic spine 10/2016 showed multilevel disc protrusions and degenerative changes, no intrinsic cord changes. Reviewed chest CT 4/2016. Reviewed chest x-ray 3/2016.       Reviewed  laboratory studies.  Reviewed labs 11/2018, [facility], including CMP, CBC.  Reviewed labs 4/2018, included A1C 6, TFTs, vitamin D mildly low.  Reviewed labs 5/2018, including CRP, normal.  Reviewed urine drug screen for 4/2018     I reviewed medical issues. Followed by primary care provider. Dermatology consulted regarding skin lesions.  Followed by ENT, notes sinus disease. Followed by GI, including regarding IBS.       Review of systems: Reviewed, no changes noted.  No fevers or chills noted. No cranial nerve symptoms are denoted. See history, otherwise review of systems unremarkable.      Family history: No changes noted. No neuromuscular disorder is noted.     I reviewed social issues.         PAST MEDICAL HISTORY:   Past Medical History        Past Medical History:   Diagnosis Date   • Anxiety     • Asthma       once with an allergic reaction   • Back pain     • Depression     • Headache     • Hypertension       with pain   • Skin lesions              PAST SURGICAL HISTORY:    Past Surgical History         Past Surgical History:   Procedure Laterality Date   • NERVE REPAIR Right 8/17/2016     Procedure: NERVE REPAIR RADIAL DIGITAL SMALL FINGER;  Surgeon: Simone Mendoza M.D.;  Location: SURGERY [facility];  Service:    • CARPAL TUNNEL RELEASE         left   • CARPAL TUNNEL RELEASE         right   • KNEE RECONSTRUCTION         Right ACL            ALLERGIES:  Codeine     MEDICATIONS:    Encounter Medications          Outpatient Encounter Prescriptions as of 2/25/2019   Medication Sig Dispense Refill   • progesterone (PROMETRIUM) 100 MG Cap         • acetaminophen/caffeine/butalbital 300-40-50 mg (FIORICET) -40 MG Cap capsule         • amlodipine-benazepril (LOTREL) 5-20 MG per capsule         • oxyCODONE-acetaminophen (PERCOCET-10)  MG Tab Take 1 Tab by mouth 2 times a day as needed for Severe Pain for up to 5 days. 10 Tab 0   • Estradiol (ESTROGEL) 0.75 MG/1.25 GM (0.06%) Gel Apply  to skin  as directed.       • metaxalone (SKELAXIN) 800 MG Tab Take 1 Tab by mouth 3 times a day. as needed for muscle spasm 20 Tab 1   • DULoxetine (CYMBALTA) 20 MG Cap DR Particles Take 20 mg by mouth every day.       • diphenhydrAMINE (BENADRYL) 25 MG Tab Take 25 mg by mouth 2 times a day as needed for Sleep.       • mupirocin (BACTROBAN) 2 % Ointment         • PROAIR  (90 Base) MCG/ACT Aero Soln inhalation aerosol         • [DISCONTINUED] predniSONE (DELTASONE) 10 MG Tab         • [DISCONTINUED] amLODIPine (NORVASC) 2.5 MG Tab Take 2.5 mg by mouth every day.       • lidocaine (LIDODERM) 5 % Patch Apply 1 Patch to skin as directed every 24 hours. as needed for nerve pain 30 Patch 5   • [DISCONTINUED] trazodone (DESYREL) 50 MG Tab Take 1/2  to 1 tablet by mouth at bedtime as needed for insomnia 30 Tab 5   • XIFAXAN 550 MG Tab tablet         • acyclovir (ZOVIRAX) 400 MG tablet         • fluocinonide (LIDEX) 0.05 % Cream         • Levocetirizine Dihydrochloride 5 MG Tab         • fluticasone (FLONASE) 50 MCG/ACT nasal spray Spray 1 Spray in nose every day.       • EVAMIST 1.53 MG/SPRAY SOLN Apply 1 Spray to affected area(s) every day.       • [DISCONTINUED] progesterone (PROMETRIUM) 200 MG capsule Take 200 mg by mouth every day.       • ibuprofen (MOTRIN) 200 MG TABS Take 200 mg by mouth 2 times a day as needed.          No facility-administered encounter medications on file as of 2/25/2019.             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    • Hobby  Hazards No   • Sleep Concern Yes       does not sleep well at times    • Stress Concern Yes       yes stressed    • Weight Concern No   • Special Diet No   • Back Care No   • Exercise No       not able to walk because of foot pain    • Bike Helmet No       does not bike    • Seat Belt Yes   • Self-Exams Yes          Social History Narrative   • No narrative on file            Vital signs: Reviewed  Constitutional: Awake, alert, no acute distress  HEENT: Normocephalic atraumatic, neck supple, no JVD noted,  no meningeal signs noted  Lymphadenopathy: no cervical, supraclavicular, or inguinal lymphadenopathy noted  Cardiovascular: Intact distal pulses, including at wrists and ankles, no limb swelling noted  Pulmonary: No tachypnea noted, no accessory muscle use noted, no dyspnea noted  Abdominal: Soft, nontender, exhibits no distension, no peritoneal signs, no HSM  Musculoskeletal:   Cervical: Tender with palpation left mid and lower cervical region, pain with range of motion testing, decreased range of motion, Spurling's testing produces axial and shoulder pain on the left  Shoulder: Only mild tenderness, only mild pain with range of motion testing, negative impingement  Back: Tender with palpation right lumbosacral region, mild tenderness mid thoracic region, mild pain with range of motion testing, trigger points noted, negative straight leg testing  Hip: Only mild tenderness, only mild pain with range of motion testing  Wrist/hand: No significant pain tenderness.  No significant pain with range of motion testing. Tinel's at wrist  Elbow: No significant tenderness.  No significant pain with range of motion testing. Tinel's at elbows  Neurological: oriented. Cranial nerves grossly intact, normal strength, non-focal.  Normal tone.  Sensation intact distally. Reflexes 1-2+ in upper and lower limbs, Gait mildly antalgic, steady, reciprocal. Able to heel/toe walk, normal Romberg and tandem gait testing, no upper motor  neuron signs evident  Skin: Skin is intact. no rashes or lesions noted  Psychiatric: normal mood and affect. speech is normal and behavior is normal.       Assessment:      1.  Cervical spondylosis, cervical facet syndrome    2.  Myofascial pain, thoracolumbar region      Plan:      1.  Therapeutic left cervical 4, 5, 6, and 7 medial branch blocks, facet blocks    2.  Trigger point injections, thoracolumbar region

## 2019-02-26 ENCOUNTER — HOSPITAL ENCOUNTER (OUTPATIENT)
Dept: RADIOLOGY | Facility: REHABILITATION | Age: 54
End: 2019-02-26
Attending: PHYSICAL MEDICINE & REHABILITATION

## 2019-02-26 ENCOUNTER — HOSPITAL ENCOUNTER (OUTPATIENT)
Dept: PAIN MANAGEMENT | Facility: REHABILITATION | Age: 54
End: 2019-02-26
Attending: PHYSICAL MEDICINE & REHABILITATION
Payer: COMMERCIAL

## 2019-02-26 VITALS
OXYGEN SATURATION: 97 % | WEIGHT: 138.45 LBS | BODY MASS INDEX: 23.64 KG/M2 | HEIGHT: 64 IN | TEMPERATURE: 98 F | DIASTOLIC BLOOD PRESSURE: 104 MMHG | RESPIRATION RATE: 14 BRPM | HEART RATE: 71 BPM | SYSTOLIC BLOOD PRESSURE: 126 MMHG

## 2019-02-26 PROCEDURE — 700111 HCHG RX REV CODE 636 W/ 250 OVERRIDE (IP)

## 2019-02-26 PROCEDURE — 64490 INJ PARAVERT F JNT C/T 1 LEV: CPT

## 2019-02-26 PROCEDURE — 99153 MOD SED SAME PHYS/QHP EA: CPT

## 2019-02-26 PROCEDURE — 99152 MOD SED SAME PHYS/QHP 5/>YRS: CPT

## 2019-02-26 PROCEDURE — 700117 HCHG RX CONTRAST REV CODE 255

## 2019-02-26 PROCEDURE — 64492 INJ PARAVERT F JNT C/T 3 LEV: CPT

## 2019-02-26 PROCEDURE — 20553 NJX 1/MLT TRIGGER POINTS 3/>: CPT

## 2019-02-26 PROCEDURE — 64491 INJ PARAVERT F JNT C/T 2 LEV: CPT

## 2019-02-26 RX ORDER — DEXAMETHASONE SODIUM PHOSPHATE 10 MG/ML
INJECTION, SOLUTION INTRAMUSCULAR; INTRAVENOUS
Status: DISCONTINUED
Start: 2019-02-26 | End: 2019-02-27 | Stop reason: HOSPADM

## 2019-02-26 RX ORDER — MIDAZOLAM HYDROCHLORIDE 1 MG/ML
INJECTION INTRAMUSCULAR; INTRAVENOUS
Status: COMPLETED
Start: 2019-02-26 | End: 2019-02-26

## 2019-02-26 RX ORDER — BUPIVACAINE HYDROCHLORIDE 5 MG/ML
INJECTION, SOLUTION EPIDURAL; INTRACAUDAL
Status: DISCONTINUED
Start: 2019-02-26 | End: 2019-02-27 | Stop reason: HOSPADM

## 2019-02-26 RX ORDER — LIDOCAINE HYDROCHLORIDE 10 MG/ML
INJECTION, SOLUTION EPIDURAL; INFILTRATION; INTRACAUDAL; PERINEURAL
Status: COMPLETED
Start: 2019-02-26 | End: 2019-02-26

## 2019-02-26 RX ORDER — TRIAMCINOLONE ACETONIDE 40 MG/ML
INJECTION, SUSPENSION INTRA-ARTICULAR; INTRAMUSCULAR
Status: COMPLETED
Start: 2019-02-26 | End: 2019-02-26

## 2019-02-26 RX ADMIN — FENTANYL CITRATE 25 MCG: 50 INJECTION, SOLUTION INTRAMUSCULAR; INTRAVENOUS at 14:28

## 2019-02-26 RX ADMIN — LIDOCAINE HYDROCHLORIDE 15 ML: 10 INJECTION, SOLUTION EPIDURAL; INFILTRATION; INTRACAUDAL; PERINEURAL at 14:15

## 2019-02-26 RX ADMIN — TRIAMCINOLONE ACETONIDE 80 MG: 40 INJECTION, SUSPENSION INTRA-ARTICULAR; INTRAMUSCULAR at 14:15

## 2019-02-26 RX ADMIN — MIDAZOLAM HYDROCHLORIDE 1 MG: 1 INJECTION, SOLUTION INTRAMUSCULAR; INTRAVENOUS at 14:28

## 2019-02-26 RX ADMIN — IOHEXOL 3 ML: 240 INJECTION, SOLUTION INTRATHECAL; INTRAVASCULAR; INTRAVENOUS; ORAL at 14:15

## 2019-02-26 NOTE — NON-PROVIDER
Current medications reviewed with pt, see medications reconciliation form. Pt sravani taking ASA or other blood thinners or anti-inflammatories.  Pt has a ride post-procedure( to drive).  Printed and verbal discharge instructions given to pt who verbalized understanding.

## 2019-02-27 ENCOUNTER — TELEPHONE (OUTPATIENT)
Dept: PHYSICAL MEDICINE AND REHAB | Facility: MEDICAL CENTER | Age: 54
End: 2019-02-27

## 2019-02-27 DIAGNOSIS — Z98.890 H/O KNEE SURGERY: ICD-10-CM

## 2019-02-27 DIAGNOSIS — M25.561 RIGHT KNEE PAIN, UNSPECIFIED CHRONICITY: ICD-10-CM

## 2019-02-27 NOTE — PROGRESS NOTES
PM&R Note:    The patient was seen yesterday, notes a flare of right knee area pain, worse with activities.  She has had remote right knee ACL repair.  No locking or giveaway noted.    On exam, healed right knee scar, tender with palpation, primarily along joint line, crepitus noted, stable with stress testing, no significant effusion noted.     The patient is inquiring about additional nonsurgical options regarding the right knee    Suspect underlying arthritis, ordered right knee x-rays, also request authorization for right knee joint injection.  Coordinate care.  Follow-up in office to review care issues.

## 2019-02-27 NOTE — TELEPHONE ENCOUNTER
I left msg of below and to see how she is from procedure   ----- Message from Eduardo High M.D. sent at 2/27/2019  1:23 PM PST -----    You can advise patient that as we had discussed at the procedure center yesterday:    I ordered a right knee x-rays, also requested authorization for right knee joint injection.    We can review right knee care issues at next office visit

## 2019-02-27 NOTE — PROCEDURES
DATE OF SERVICE:  02/26/2019    DIAGNOSES:  1.  Cervical spondylosis, cervical facet syndrome.  2.  Myofascial pain, thoracolumbar region.    PROCEDURES:  1.  Therapeutic left cervical 4, 5, 6 and 7 medial branch blocks, facet   blocks.  2.  Trigger point injections, thoracolumbar region.    PROCEDURE NOTE:  Informed consent was obtained.  Risks, benefits, and   alternatives discussed and all questions answered.  An angiocatheter was   placed in the upper limb.  The patient was placed prone on the fluoroscopy   table.  The skin area from the occiput to T4 was prepared in a sterile manner   with povidone-iodine.  Intravenous conscious sedation using 1 mg of midazolam   and 25 mcg of fentanyl was administered to the patient.  The patient was   monitored throughout the procedure.    Using fluoroscopy with the patient in prone position, cavities of the left   cervical 4, 5, 6, and 7 were identified.  Those areas were then locally   anesthetized with 2 mL of 1% preservative-free lidocaine.    Using fluoroscopy, 25-gauge spinal needle was inserted and advanced into   cavities of left cervical 4, 5, 6, and 7, confirmed on multiplanar imaging.    Next, 0.5 mL of Iohexol dye was injected at each site to ensure nonvascular   flow prior to injection.  Then, following negative aspiration, 1 mL of a   mixture of 1 mL of Kenalog 40 mg/mL and 3 mL of 1% preservative-free lidocaine   was injected at each site.  A total of 4 spinal needles were used for the   medial branch blocks.  There were no complications.    Following recovery, trigger points were identified, 2 in the right lower   thoracic paraspinal muscles, right thoracic extensor muscle group, and 2 in   the right lumbosacral paraspinal muscles, right lumbosacral extensor muscular   group.  The areas were marked and sterilely prepared.  Then, using a 25-gauge   needle, trigger point injections were performed with injection of 1 mL of a   mixture of 1 mL of Kenalog 40 mg/mL  and 3 mL of 1% lidocaine at each site.    The patient tolerated the procedure well.  There were no complications.    The patient was transported to recovery.  Postinjection instructions were   reviewed with the patient.  Then, the patient was discharged to home in the   care of a friend and/or family member.       ____________________________________     MD BARTOLOME TUCKER / STANLEY    DD:  02/26/2019 15:09:31  DT:  02/26/2019 16:34:59    D#:  [id number]  Job#:  [id number]

## 2019-03-21 ENCOUNTER — APPOINTMENT (OUTPATIENT)
Dept: PHYSICAL MEDICINE AND REHAB | Facility: MEDICAL CENTER | Age: 54
End: 2019-03-21
Payer: COMMERCIAL

## 2019-03-29 ENCOUNTER — OFFICE VISIT (OUTPATIENT)
Dept: PHYSICAL MEDICINE AND REHAB | Facility: MEDICAL CENTER | Age: 54
End: 2019-03-29
Payer: COMMERCIAL

## 2019-03-29 VITALS
OXYGEN SATURATION: 97 % | DIASTOLIC BLOOD PRESSURE: 84 MMHG | SYSTOLIC BLOOD PRESSURE: 126 MMHG | TEMPERATURE: 97.5 F | HEIGHT: 64 IN | HEART RATE: 80 BPM | BODY MASS INDEX: 23.22 KG/M2 | WEIGHT: 136 LBS

## 2019-03-29 DIAGNOSIS — M79.2 NERVE PAIN: ICD-10-CM

## 2019-03-29 DIAGNOSIS — M25.512 LEFT SHOULDER PAIN, UNSPECIFIED CHRONICITY: ICD-10-CM

## 2019-03-29 DIAGNOSIS — M54.2 NECK PAIN: ICD-10-CM

## 2019-03-29 DIAGNOSIS — M25.559 ARTHRALGIA OF HIP, UNSPECIFIED LATERALITY: ICD-10-CM

## 2019-03-29 DIAGNOSIS — M47.812 SPONDYLOSIS OF CERVICAL REGION WITHOUT MYELOPATHY OR RADICULOPATHY: ICD-10-CM

## 2019-03-29 DIAGNOSIS — M54.12 CERVICAL RADICULITIS: ICD-10-CM

## 2019-03-29 DIAGNOSIS — M54.9 MID BACK PAIN: ICD-10-CM

## 2019-03-29 DIAGNOSIS — M54.50 LUMBOSACRAL PAIN: ICD-10-CM

## 2019-03-29 PROCEDURE — 99214 OFFICE O/P EST MOD 30 MIN: CPT | Performed by: PHYSICAL MEDICINE & REHABILITATION

## 2019-03-29 RX ORDER — FEXOFENADINE HCL 60 MG/1
60 TABLET, FILM COATED ORAL DAILY
COMMUNITY
End: 2020-02-13

## 2019-03-29 RX ORDER — GABAPENTIN 300 MG/1
CAPSULE ORAL
Qty: 60 CAP | Refills: 1 | Status: SHIPPED | OUTPATIENT
Start: 2019-03-29

## 2019-03-29 ASSESSMENT — PATIENT HEALTH QUESTIONNAIRE - PHQ9: CLINICAL INTERPRETATION OF PHQ2 SCORE: 0

## 2019-03-29 NOTE — PROGRESS NOTES
SUBJECTIVE:   Ms. Taylor returns to the office today for followup evaluation of spinal/joint/musculoskeletal pain.    On 2/26/2019 the patient underwent therapeutic left cervical 4, 5, 6 and 7 medial branch blocks, facet blocks.  She notes the left cervical area pain is somewhat improved, unfortunately, she notes a change with her pain pattern.    She now notes ongoing left cervical radicular pain, radiating to the C6-7 distribution, worse with activities, also worse with changes with head position.    She notes intermittent left shoulder area pain, primarily activity associated.    No significant carpal tunnel symptoms.  The patient had prior bilateral carpal tunnel releases    The patient notes thoracolumbar pain is controlled, without radicular component.  She had benefit with trigger point injections performed in 2/2019.    She notes intermittent hip area pain, controlled     The patient notes joint/musculoskeletal pain, notes pain flares, primarily activity associated.    She notes foot/ankle area pain, podiatry consulted.    She notes intermittent headaches, relatively controlled.    She notes intermittent difficulty with sleep, pain associated, has had benefit with gabapentin in the past.    She has had prior treatment including medications, including NSAIDs. She has been to physical therapy, without benefit regarding the cervical radicular pain. She has tried acupuncture, chiropractics, and massage therapy, transiently palliative.   She has had prior injections, with benefit. She denies bowel/bladder dysfunction. She denies overt limb weakness. She denies cardiopulmonary symptomatology. She is making an effort with her home exercise program.  The ongoing cervical radicular pain is limiting her ability to function.  She is inquiring about additional treatment options.      MEDICAL RECORDS REVIEW/DATA/REVIEW OF SYSTEMS:   Reviewed in epic.    I reviewed  medications: Tried ibuprofen/NSAIDs.  Only minimal benefit with Skelaxin.  Gabapentin at night, tolerated, with some benefit.  Notes no benefit with Lidoderm patch trial.    Note prior trial, not effective or tolerated: prozac, baclofen, robaxin, tizanidine, xanax.    I reviewed diagnostic studies.     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     Reviewed MRI lumbar spine 4/2013, showed multilevel degenerative changes, disc protrusion and foraminal stenosis greatest at left L5-S1. Lumbar spine x-rays 4/2013 show degenerative changes, no instability.     MRI thoracic spine 10/2016 showed multilevel disc protrusions and degenerative changes, no intrinsic cord changes. Reviewed chest CT 4/2016. Reviewed chest x-ray 3/2016.     Left shoulder x-rays 12/2016 showed  ac arthritis. Left hip x-rays 2/2013 were unremarkable.      Head CT 5/2014 was unremarkable.     Reviewed laboratory studies.  Reviewed labs 11/2018, [facility], including CMP, CBC.  Reviewed labs 4/2018, included A1C 6, TFTs, vitamin D mildly low.  Reviewed labs 5/2018, including CRP, normal.      I reviewed medical issues. Followed by primary care provider. Dermatology consulted regarding skin lesions.  Followed by ENT, notes sinus disease. Followed by GI, including regarding IBS. The patient notes she is perimenopausal, care per gynecology.      Review of systems: Reviewed, no changes noted.  No fevers or chills noted. No cranial nerve symptoms are denoted. See history, otherwise review of systems unremarkable.     Family history: No changes noted. No neuromuscular disorder is noted.    I reviewed social issues.       PAST MEDICAL HISTORY:   Past Medical History:   Diagnosis Date   • Anxiety    • Asthma     once with an  allergic reaction   • Back pain    • Depression    • Headache    • Hypertension     with pain   • Skin lesions        PAST SURGICAL HISTORY:    Past Surgical History:   Procedure Laterality Date   • NERVE REPAIR Right 8/17/2016    Procedure: NERVE REPAIR RADIAL DIGITAL SMALL FINGER;  Surgeon: Simone Mendoza M.D.;  Location: SURGERY [facility];  Service:    • CARPAL TUNNEL RELEASE      left   • CARPAL TUNNEL RELEASE      right   • KNEE RECONSTRUCTION      Right ACL       ALLERGIES:  Codeine    MEDICATIONS:    Outpatient Encounter Prescriptions as of 3/29/2019   Medication Sig Dispense Refill   • fexofenadine (ALLEGRA) 60 MG Tab Take 60 mg by mouth every day.     • gabapentin (NEURONTIN) 300 MG Cap Take 1 to 2 capsules by mouth at bedtime as needed for nerve pain or insomnia. 60 Cap 1   • progesterone (PROMETRIUM) 100 MG Cap      • acetaminophen/caffeine/butalbital 300-40-50 mg (FIORICET) -40 MG Cap capsule      • amlodipine-benazepril (LOTREL) 5-20 MG per capsule      • PROAIR  (90 Base) MCG/ACT Aero Soln inhalation aerosol      • Estradiol (ESTROGEL) 0.75 MG/1.25 GM (0.06%) Gel Apply  to skin as directed.     • metaxalone (SKELAXIN) 800 MG Tab Take 1 Tab by mouth 3 times a day. as needed for muscle spasm 20 Tab 1   • DULoxetine (CYMBALTA) 20 MG Cap DR Particles Take 20 mg by mouth every day.     • acyclovir (ZOVIRAX) 400 MG tablet      • fluocinonide (LIDEX) 0.05 % Cream      • Levocetirizine Dihydrochloride 5 MG Tab      • diphenhydrAMINE (BENADRYL) 25 MG Tab Take 25 mg by mouth 2 times a day as needed for Sleep.     • fluticasone (FLONASE) 50 MCG/ACT nasal spray Spray 1 Spray in nose every day.     • ibuprofen (MOTRIN) 200 MG TABS Take 600 mg by mouth every 8 hours as needed.     • mupirocin (BACTROBAN) 2 % Ointment      • lidocaine (LIDODERM) 5 % Patch Apply 1 Patch to skin as directed every 24 hours. as needed for nerve pain 30 Patch 5   • XIFAXAN 550 MG Tab tablet      • EVAMIST 1.53  MG/SPRAY SOLN Apply 1 [place] to affected area(s) every day.       No facility-administered encounter medications on file as of 3/29/2019.        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    • Hobby Hazards No   • Sleep Concern Yes     does not sleep well at times    • Stress Concern Yes     yes stressed    • Weight Concern No   • Special Diet No   • Back Care No   • Exercise No     not able to walk because of foot pain    • Bike Helmet No     does not bike    • Seat Belt Yes   • Self-Exams Yes     Social History Narrative   • No narrative on file       Vital signs: Reviewed  Constitutional: Awake, alert, no acute distress  HEENT: Normocephalic atraumatic, neck supple, no JVD noted,  no meningeal signs noted  Lymphadenopathy: no cervical, supraclavicular, or inguinal lymphadenopathy noted  Cardiovascular: Intact distal pulses, including at wrists and ankles, no limb swelling noted  Pulmonary: No tachypnea noted, no accessory muscle use noted, no dyspnea noted  Abdominal: Soft, nontender, exhibits no distension, no peritoneal signs, no HSM  Musculoskeletal:   Cervical: Tender with palpation left greater than right cervical region, pain with range of motion testing, Spurling's testing produces axial and upper trapezius pain on the left  Shoulder: Mild tenderness, mild pain with range of motion testing, negative impingement  Wrist/hand: Minimal tenderness, minimal pain with range of motion testing, negative Tinel's  Elbow: No significant tenderness, no significant pain with range of motion  testing, negative Tinel's  Thoracolumbar: Only mild tenderness, only mild pain with range of motion testing, negative straight leg testing  Neurological: oriented. Cranial nerves grossly intact, normal strength, non-focal.  Normal tone.  Sensation intact distally. Reflexes 1-2+ in upper and lower limbs, Gait steady, reciprocal.   Skin: Skin is intact. no rashes or lesions noted  Psychiatric: normal mood and affect. speech is normal and behavior is normal.         ASSESSMENT:    1.  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  She has tried medications, including NSAIDs and symptomatic medications.  She has been to physical therapy.  She has had a recent x-rays performed.  - Referred the patient to physical therapy for retrial.    2.  Lumbosacral pain, myofascial pain, disc protrusion, degenerative disc disease, lumbar spondylosis, facet syndrome, consider sacroiliac joint disorder, symptomatically controlled    3.  Mid back pain, myofascial pain, disc protrusion, degenerative disc disease, spondylosis, facet syndrome, symptomatically controlled    - Reviewed injection therapy with trigger point injections to treat the residual myofascial component to the ongoing pain, perform at procedure center    4.  Hip pain, sprain strain, symptomatically controlled    5.  Joint/musculoskeletal pain    - Note, foot/ankle pain, with care per podiatry    6.  Insomnia    - Review sleep hygiene    7.  Comorbid medical issues, with care per primary care provider      DISCUSSION/PLAN:    - I discussed management options. I reviewed symptomatic care    - I reviewed home exercise program, with medical precautions.  I reviewed activity  modification    - The patient can consider complementary trials with acupuncture, superficial massage therapy, or TENS unit    - I reviewed medication monitoring.  I reviewed medication adjustments, updated prescription for gabapentin.    - I reviewed risks, side effects, and interactions of medications, including NSAIDs and over-the-counter medications.  I reviewed further symptomatic medications.    - I reviewed additional diagnostic options, including further/advanced imaging, including cervical spine flexion/extension radiographs, electrodiagnostic testing, vascular studies, and further lab screen    - I reviewed additional therapeutic options, including further injection/interventional therapy and additional consultative input    - Return after the above noted MRI or an as-needed basis      Please note that this dictation was created using voice recognition software. I have made every reasonable attempt to correct obvious errors but there may be errors of grammar and content that I may have overlooked prior to finalization of this note.

## 2019-04-22 ENCOUNTER — TELEPHONE (OUTPATIENT)
Dept: PHYSICAL MEDICINE AND REHAB | Facility: MEDICAL CENTER | Age: 54
End: 2019-04-22

## 2019-04-22 NOTE — TELEPHONE ENCOUNTER
Angeline from Radiology (Ext [phone]) called and said a peer to peer has to be done for authorization for MRI C spine. Daylin is scheduled for MRI 4/24/19 at 5pm. Angeline would like to know if this can be done soon, otherwise they will have to reschedule appt.     Her insurance ID is :[id number]   Peer to peer # [phone].   Tracking # [id number]

## 2019-04-22 NOTE — TELEPHONE ENCOUNTER
Daylin called and said [facility] called to see if she wanted to make an appt.     I let her know I did not see a referral in her chart for Neurosurgery, although I could be missing it. She said she is not sure why they called her. I let her know I will check with Dr. High to see if he put in referral.     She wanted Dr. High to know she is scheduled to go to [place] 5/15/19.

## 2019-04-22 NOTE — TELEPHONE ENCOUNTER
I let Angeline in radiology know auth is in place from peer review:  Completed peer review, MRI cervical spine authorized,     Authorization #[id number], valid until 5/19/2019     Please coordinate with radiology and patient regarding this (Routing comment)    I let Robbin know above as well and scheduled her for follow up after imaging.

## 2019-04-24 ENCOUNTER — APPOINTMENT (OUTPATIENT)
Dept: RADIOLOGY | Facility: MEDICAL CENTER | Age: 54
End: 2019-04-24
Attending: PHYSICAL MEDICINE & REHABILITATION
Payer: COMMERCIAL

## 2019-04-30 ENCOUNTER — HOSPITAL ENCOUNTER (OUTPATIENT)
Dept: RADIOLOGY | Facility: MEDICAL CENTER | Age: 54
End: 2019-04-30
Attending: PHYSICAL MEDICINE & REHABILITATION
Payer: COMMERCIAL

## 2019-04-30 DIAGNOSIS — M79.2 NERVE PAIN: ICD-10-CM

## 2019-04-30 DIAGNOSIS — M54.12 CERVICAL RADICULITIS: ICD-10-CM

## 2019-04-30 DIAGNOSIS — M47.812 SPONDYLOSIS OF CERVICAL REGION WITHOUT MYELOPATHY OR RADICULOPATHY: ICD-10-CM

## 2019-04-30 DIAGNOSIS — M54.2 NECK PAIN: ICD-10-CM

## 2019-04-30 PROCEDURE — 72141 MRI NECK SPINE W/O DYE: CPT

## 2019-05-03 ENCOUNTER — OFFICE VISIT (OUTPATIENT)
Dept: PHYSICAL MEDICINE AND REHAB | Facility: MEDICAL CENTER | Age: 54
End: 2019-05-03
Payer: COMMERCIAL

## 2019-05-03 VITALS
OXYGEN SATURATION: 97 % | TEMPERATURE: 97.5 F | HEART RATE: 90 BPM | WEIGHT: 139.99 LBS | SYSTOLIC BLOOD PRESSURE: 118 MMHG | HEIGHT: 64 IN | DIASTOLIC BLOOD PRESSURE: 78 MMHG | BODY MASS INDEX: 23.9 KG/M2

## 2019-05-03 DIAGNOSIS — M54.2 NECK PAIN: ICD-10-CM

## 2019-05-03 DIAGNOSIS — M47.812 SPONDYLOSIS OF CERVICAL REGION WITHOUT MYELOPATHY OR RADICULOPATHY: ICD-10-CM

## 2019-05-03 DIAGNOSIS — M25.50 ARTHRALGIA, UNSPECIFIED JOINT: ICD-10-CM

## 2019-05-03 DIAGNOSIS — M54.50 LUMBOSACRAL PAIN: ICD-10-CM

## 2019-05-03 DIAGNOSIS — M54.12 CERVICAL RADICULITIS: ICD-10-CM

## 2019-05-03 DIAGNOSIS — M79.18 MYOFASCIAL PAIN: ICD-10-CM

## 2019-05-03 DIAGNOSIS — M25.519 SHOULDER PAIN, UNSPECIFIED CHRONICITY, UNSPECIFIED LATERALITY: ICD-10-CM

## 2019-05-03 DIAGNOSIS — M54.9 MID BACK PAIN: ICD-10-CM

## 2019-05-03 PROCEDURE — 20552 NJX 1/MLT TRIGGER POINT 1/2: CPT | Performed by: PHYSICAL MEDICINE & REHABILITATION

## 2019-05-03 PROCEDURE — 99214 OFFICE O/P EST MOD 30 MIN: CPT | Mod: 25 | Performed by: PHYSICAL MEDICINE & REHABILITATION

## 2019-05-03 RX ORDER — PREDNISONE 10 MG/1
TABLET ORAL
Refills: 3 | COMMUNITY
Start: 2019-04-03 | End: 2019-05-03

## 2019-05-03 RX ORDER — CEFUROXIME AXETIL 250 MG/1
TABLET ORAL
Refills: 0 | COMMUNITY
Start: 2019-04-04 | End: 2019-05-03

## 2019-05-03 ASSESSMENT — PATIENT HEALTH QUESTIONNAIRE - PHQ9: CLINICAL INTERPRETATION OF PHQ2 SCORE: 0

## 2019-05-03 NOTE — PROGRESS NOTES
SUBJECTIVE:   Ms. Taylor returns to the office today for followup evaluation of spinal/joint/musculoskeletal pain.    I reviewed intercurrent medical issues:    The patient notes flare of abdominal area pain, IBS symptoms, GI consulted, labs performed, records pending.  The patient has stopped NSAID use due to GI issues.    Regarding today's visit:    The patient notes ongoing pain in the left cervical region, also radiating pain to the left arm, with neuropathic component, worse with activities, worse with changes with head position.  The patient had some transient benefit with therapeutic left cervical 4, 5, 6 and 7 medial branch blocks, facet blocks.     She notes intermittent left shoulder area pain, primarily activity associated, now relatively controlled    No overt carpal tunnel symptoms noted. The patient had prior bilateral carpal tunnel releases    The patient notes mid back pain and low back pain is relatively controlled, without radicular component.  The patient had benefit with prior injection therapy for pain flare    She notes intermittent hip area pain, controlled     The patient notes joint/musculoskeletal pain, notes pain flares, primarily activity associated.    She notes foot/ankle area pain, podiatry consulted.    She notes intermittent headaches, relatively controlled.    She notes intermittent difficulty with sleep    She has had prior treatment including medications, including NSAIDs.  She has been to physical therapy, retrial pending.  She has tried acupuncture, chiropractics, and massage therapy, transiently palliative.   She has had prior injections, with benefit. She denies bowel/bladder dysfunction. She denies overt limb weakness. She denies cardiopulmonary symptomatology. She is making an effort with her home exercise program.  The ongoing neck area pain is limiting her ability to function.  She is inquiring about additional  treatment options.      MEDICAL RECORDS REVIEW/DATA/REVIEW OF SYSTEMS:   Reviewed in epic.    I reviewed medications: Tried ibuprofen/NSAIDs, now avoids due to GI issue.   Gabapentin at night, tolerated, with some benefit. Only minimal benefit with Skelaxin.   Notes no benefit with Lidoderm patch trial.    Note prior trial, not effective or tolerated: prozac, baclofen, robaxin, tizanidine, xanax.    I reviewed diagnostic studies.     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     MRI thoracic spine 10/2016 showed multilevel disc protrusions and degenerative changes, no intrinsic cord changes. Reviewed chest CT 4/2016. Reviewed chest x-ray 3/2016.     Reviewed MRI lumbar spine 4/2013, showed multilevel degenerative changes, disc protrusion and foraminal stenosis greatest at left L5-S1. Lumbar spine x-rays 4/2013 show degenerative changes, no instability.     Left hip x-rays 2/2013 were unremarkable.      Head CT 5/2014 was unremarkable.     Reviewed laboratory studies.  Reviewed labs 11/2018, [facility], including CMP, CBC.  Reviewed labs 4/2018, included A1C 6, TFTs, vitamin D mildly low.  Reviewed labs 5/2018, including CRP, normal.      I reviewed medical issues. Followed by primary care provider. Dermatology consulted regarding skin lesions.  Followed by ENT, notes sinus disease.       Review of systems: Reviewed, no changes noted.  No fevers or chills noted. No cranial nerve symptoms are denoted. See history, otherwise review of systems unremarkable.     Family history: No  changes noted. No neuromuscular disorder is noted.    I reviewed social issues.       PAST MEDICAL HISTORY:   Past Medical History:   Diagnosis Date   • Anxiety    • Asthma     once with an allergic reaction   • Back pain    • Depression    • Headache    • Hypertension     with pain   • IBS (irritable bowel syndrome)     [facility]   • Skin lesions        PAST SURGICAL HISTORY:    Past Surgical History:   Procedure Laterality Date   • NERVE REPAIR Right 8/17/2016    Procedure: NERVE REPAIR RADIAL DIGITAL SMALL FINGER;  Surgeon: Simone Mendoza M.D.;  Location: SURGERY [facility];  Service:    • CARPAL TUNNEL RELEASE      left   • CARPAL TUNNEL RELEASE      right   • KNEE RECONSTRUCTION      Right ACL       ALLERGIES:  Codeine    MEDICATIONS:    Outpatient Encounter Prescriptions as of 5/3/2019   Medication Sig Dispense Refill   • Pseudoeph-Doxylamine-DM-APAP (NYQUIL PO) Take  by mouth.     • gabapentin (NEURONTIN) 300 MG Cap Take 1 to 2 capsules by mouth at bedtime as needed for nerve pain or insomnia. 60 Cap 1   • progesterone (PROMETRIUM) 100 MG Cap      • amlodipine-benazepril (LOTREL) 5-20 MG per capsule      • Estradiol (ESTROGEL) 0.75 MG/1.25 GM (0.06%) Gel Apply  to skin as directed.     • DULoxetine (CYMBALTA) 20 MG Cap DR Particles Take 20 mg by mouth every day.     • fluticasone (FLONASE) 50 MCG/ACT nasal spray Spray 1 Spray in nose every day.     • [DISCONTINUED] predniSONE (DELTASONE) 10 MG Tab   3   • [DISCONTINUED] cefUROXime (CEFTIN) 250 MG Tab   0   • fexofenadine (ALLEGRA) 60 MG Tab Take 60 mg by mouth every day.     • mupirocin (BACTROBAN) 2 % Ointment      • acetaminophen/caffeine/butalbital 300-40-50 mg (FIORICET) -40 MG Cap capsule      • PROAIR  (90 Base) MCG/ACT Aero Soln inhalation aerosol      • metaxalone (SKELAXIN) 800 MG Tab Take 1 Tab by mouth 3 times a day. as needed for muscle spasm (Patient not taking: Reported on 5/3/2019) 20 Tab 1   • lidocaine (LIDODERM)  5 % Patch Apply 1 Patch to skin as directed every 24 hours. as needed for nerve pain (Patient not taking: Reported on 5/3/2019) 30 Patch 5   • XIFAXAN 550 MG Tab tablet      • acyclovir (ZOVIRAX) 400 MG tablet      • fluocinonide (LIDEX) 0.05 % Cream      • Levocetirizine Dihydrochloride 5 MG Tab      • diphenhydrAMINE (BENADRYL) 25 MG Tab Take 25 mg by mouth 2 times a day as needed for Sleep.     • EVAMIST 1.53 MG/SPRAY SOLN Apply 1 Spray to affected area(s) every day.     • [DISCONTINUED] ibuprofen (MOTRIN) 200 MG TABS Take 600 mg by mouth every 8 hours as needed.       No facility-administered encounter medications on file as of 5/3/2019.        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    • Hobby Hazards No   • Sleep Concern Yes     does not sleep well at times    • Stress Concern Yes     yes stressed    • Weight Concern No   • Special Diet No   • Back Care No   • Exercise No     not able to walk because of foot pain    • Bike Helmet No     does not bike    • Seat Belt Yes   • Self-Exams Yes     Social History Narrative   • No narrative on file       Vital signs: Reviewed  Constitutional: Awake, alert, no acute distress  HEENT: Normocephalic atraumatic, neck supple, no JVD noted,  no meningeal signs noted  Lymphadenopathy: no cervical, supraclavicular, or occipital lymphadenopathy noted  Cardiovascular: Intact distal pulses, including at wrists and ankles, no limb swelling noted  Pulmonary: No tachypnea noted, no accessory muscle use noted, no dyspnea noted  Abdominal: Soft, nontender,  exhibits no distension, no peritoneal signs, no HSM  Musculoskeletal:   Cervical: Tender with palpation primarily left cervical region, pain with range of motion testing, trigger points noted, Spurling's testing produces axial pain on the left  Shoulder: Only mild tenderness with palpation about left shoulder, mild pain with range of motion testing, negative impingement  Wrist/hand: No significant pain tenderness.  No significant pain with range of motion testing. Tinel's at wrist  Elbow: No significant tenderness.  No significant pain with range of motion testing. Tinel's at elbows  Neurological: oriented. Cranial nerves grossly intact, strength non-focal.  Normal tone.  Sensation intact distally. Reflexes 1+ in upper  limbs, Gait steady, reciprocal, No upper motor neuron signs evident  Skin: Skin is intact. no rashes or lesions noted  Psychiatric: normal mood and affect. speech is normal and behavior is normal.         Procedure note: Written/informed consent was obtained, risk benefits and alternatives discussed, and all questions were answered.  The patient was placed prone in the exam table and 3 trigger points were identified in the left cervical paraspinal muscles, left cervical extensor muscle group.  The areas were marked, then sterilely prepared.  Then using a 25-gauge needle, trigger point injections were performed with injection of 1 to 2 cc of 1% lidocaine at each site.  The patient tolerated the procedure well.  She noted decreased pain post procedure.  There were no complications.          ASSESSMENT:    1.  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  Left shoulder pain, sprain strain    - Initiate physical therapy, previously ordered    3.  Mid back pain, disc protrusion,  degenerative disc disease, spondylosis, facet syndrome, symptomatically controlled     Lumbosacral pain, disc protrusion, degenerative disc disease, lumbar spondylosis, facet syndrome, consider sacroiliac joint disorder, symptomatically controlled    4.  Hip pain, sprain strain, symptomatically controlled    5.  Joint/musculoskeletal pain    - Note, foot/ankle pain, with care per podiatry    6.  Insomnia    - Review sleep hygiene    7.  Comorbid medical issues, including IBS symptoms, with care per primary care provider and consultants, including GI    - I would be interested in reviewing most recent GI records and laboratory studies      DISCUSSION/PLAN:    - I discussed management options. I reviewed symptomatic care    - I reviewed home exercise program, with medical precautions.  I reviewed activity modification    - The patient can consider complementary trials with acupuncture, superficial massage therapy, or TENS unit    - I reviewed medication monitoring.  For now, continue current medications. I reviewed medication adjustments.    - I reviewed risks, side effects, and interactions of medications, including over-the-counter medications.  I reviewed further symptomatic medications.    - I reviewed additional diagnostic options, including further/advanced imaging, including cervical spine flexion/extension radiographs, electrodiagnostic testing, vascular studies, and further lab screen    - I reviewed additional therapeutic options, including further injection/interventional therapy and additional consultative input    - Return in 2-4 weeks or an as-needed basis      Please note that this dictation was created using voice recognition software. I have made every reasonable attempt to correct obvious errors but there may be errors of grammar and content that I may have overlooked prior to finalization of this note.

## 2019-05-09 ENCOUNTER — APPOINTMENT (OUTPATIENT)
Dept: RADIOLOGY | Facility: MEDICAL CENTER | Age: 54
End: 2019-05-09
Attending: OBSTETRICS & GYNECOLOGY
Payer: COMMERCIAL

## 2019-05-15 ENCOUNTER — OFFICE VISIT (OUTPATIENT)
Dept: PHYSICAL MEDICINE AND REHAB | Facility: MEDICAL CENTER | Age: 54
End: 2019-05-15
Payer: COMMERCIAL

## 2019-05-15 ENCOUNTER — HOSPITAL ENCOUNTER (OUTPATIENT)
Dept: RADIOLOGY | Facility: MEDICAL CENTER | Age: 54
End: 2019-05-15
Attending: OBSTETRICS & GYNECOLOGY
Payer: COMMERCIAL

## 2019-05-15 VITALS
HEART RATE: 80 BPM | TEMPERATURE: 98.8 F | OXYGEN SATURATION: 96 % | WEIGHT: 138.67 LBS | DIASTOLIC BLOOD PRESSURE: 72 MMHG | BODY MASS INDEX: 23.67 KG/M2 | HEIGHT: 64 IN | SYSTOLIC BLOOD PRESSURE: 128 MMHG

## 2019-05-15 DIAGNOSIS — M47.812 SPONDYLOSIS OF CERVICAL REGION WITHOUT MYELOPATHY OR RADICULOPATHY: ICD-10-CM

## 2019-05-15 DIAGNOSIS — M25.50 ARTHRALGIA, UNSPECIFIED JOINT: ICD-10-CM

## 2019-05-15 DIAGNOSIS — M79.2 NERVE PAIN: ICD-10-CM

## 2019-05-15 DIAGNOSIS — M54.12 CERVICAL RADICULITIS: ICD-10-CM

## 2019-05-15 DIAGNOSIS — Z12.39 SCREENING BREAST EXAMINATION: ICD-10-CM

## 2019-05-15 DIAGNOSIS — M79.18 MYOFASCIAL PAIN: ICD-10-CM

## 2019-05-15 DIAGNOSIS — M54.2 NECK PAIN: ICD-10-CM

## 2019-05-15 DIAGNOSIS — M54.50 LUMBOSACRAL PAIN: ICD-10-CM

## 2019-05-15 DIAGNOSIS — M54.9 MID BACK PAIN: ICD-10-CM

## 2019-05-15 PROCEDURE — 77067 SCR MAMMO BI INCL CAD: CPT

## 2019-05-15 PROCEDURE — 99214 OFFICE O/P EST MOD 30 MIN: CPT | Performed by: PHYSICAL MEDICINE & REHABILITATION

## 2019-05-15 ASSESSMENT — PATIENT HEALTH QUESTIONNAIRE - PHQ9: CLINICAL INTERPRETATION OF PHQ2 SCORE: 0

## 2019-05-15 NOTE — PROGRESS NOTES
SUBJECTIVE:   Ms. Taylor returns to the office today for followup evaluation of spinal/joint/musculoskeletal pain.    I reviewed intercurrent medical issues:    Since I last saw her, the patient had follow-up with GI regarding IBS symptoms, some records available for review.  The patient also had imaging of the abdomen/kidney at [facility], report not available for review, apparently the patient has a right kidney cyst, further imaging pending, urology consulted, appointment pending    Regarding today's visit:    The patient notes only transient benefit with the cervical trigger point injections without steroids performed the last visit, no complications noted.  The patient notes ongoing pain in the cervical region, also left upper limb radiating pain with neuropathic component, worse with activities, also worse with changes with head position. The patient had some transient benefit with therapeutic left cervical 4, 5, 6 and 7 medial branch blocks, facet blocks.     The patient notes intermittent left shoulder area pain, primarily activity associated, now relatively controlled    No overt carpal tunnel symptoms noted.  The patient has had prior bilateral carpal tunnel releases.    The patient notes only intermittent mid back pain, controlled, without radicular compound    The patient notes only intermittent lumbosacral pain, controlled, without radicular compound    The patient notes only intermittent hip area pain, relatively controlled.    The patient notes joint/musculoskeletal pain, notes pain flares, primarily activity associated.    She notes foot/ankle area pain, podiatry consulted.    She notes intermittent headaches, relatively controlled.    She notes intermittent difficulty with sleep    She has had prior treatment including medications, including NSAIDs.  She has been to physical therapy, retrial pending.  She has tried  acupuncture, chiropractics, and massage therapy, transiently palliative.   She has had prior injections, with benefit. She denies bowel/bladder dysfunction. She denies overt limb weakness. She denies cardiopulmonary symptomatology. She is making an effort with her home exercise program.  The neck area pain is limiting her ability to function.  She is inquiring about additional treatment options.      MEDICAL RECORDS REVIEW/DATA/REVIEW OF SYSTEMS:   Reviewed in epic.    I reviewed medications:  Gabapentin at night, tolerated, with some benefit. Only minimal benefit with Skelaxin.   Notes no benefit with Lidoderm patch trial.    Note prior trial, not effective or tolerated: prozac, baclofen, robaxin, tizanidine, xanax.  The patient previously stopped ibuprofen/NSAIDs due to GI issues.     I reviewed diagnostic studies.     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     MRI thoracic spine 10/2016 showed multilevel disc protrusions and degenerative changes, no intrinsic cord changes. Reviewed chest CT 4/2016. Reviewed chest x-ray 3/2016.     Reviewed MRI lumbar spine 4/2013, showed multilevel degenerative changes, disc protrusion and foraminal stenosis greatest at left L5-S1. Lumbar spine x-rays 4/2013 show degenerative changes, no instability.     Left hip x-rays 2/2013 were unremarkable.      Head CT 5/2014 was unremarkable.     Reviewed laboratory studies.  Reviewed labs 5/2019, [facility], including CMP, CBC. Reviewed labs 4/2018, included A1C 6, TFTs, vitamin D mildly low.  Reviewed labs  5/2018, including CRP, normal.      I reviewed medical issues. Followed by primary care provider. Dermatology consulted regarding skin lesions.  Followed by ENT, notes sinus disease.       Review of systems: Reviewed, no changes noted.  No fevers or chills noted. No cranial nerve symptoms are denoted. See history, otherwise review of systems unremarkable.     Family history: No changes noted. No neuromuscular disorder is noted.    I reviewed social issues.       PAST MEDICAL HISTORY:   Past Medical History:   Diagnosis Date   • Anxiety    • Asthma     once with an allergic reaction   • Back pain    • Depression    • Headache    • Hypertension     with pain   • IBS (irritable bowel syndrome)     [facility]   • Skin lesions        PAST SURGICAL HISTORY:    Past Surgical History:   Procedure Laterality Date   • NERVE REPAIR Right 8/17/2016    Procedure: NERVE REPAIR RADIAL DIGITAL SMALL FINGER;  Surgeon: Simone Mendoza M.D.;  Location: SURGERY [facility];  Service:    • CARPAL TUNNEL RELEASE      left   • CARPAL TUNNEL RELEASE      right   • KNEE RECONSTRUCTION      Right ACL       ALLERGIES:  Codeine    MEDICATIONS:    Outpatient Encounter Prescriptions as of 5/15/2019   Medication Sig Dispense Refill   • Pseudoeph-Doxylamine-DM-APAP (NYQUIL PO) Take  by mouth.     • fexofenadine (ALLEGRA) 60 MG Tab Take 60 mg by mouth every day.     • gabapentin (NEURONTIN) 300 MG Cap Take 1 to 2 capsules by mouth at bedtime as needed for nerve pain or insomnia. 60 Cap 1   • progesterone (PROMETRIUM) 100 MG Cap      • mupirocin (BACTROBAN) 2 % Ointment      • acetaminophen/caffeine/butalbital 300-40-50 mg (FIORICET) -40 MG Cap capsule      • amlodipine-benazepril (LOTREL) 5-20 MG per capsule      • PROAIR  (90 Base) MCG/ACT Aero Soln inhalation aerosol      • Estradiol (ESTROGEL) 0.75 MG/1.25 GM (0.06%) Gel Apply  to skin as directed.     • metaxalone (SKELAXIN) 800 MG Tab Take 1 Tab by mouth 3 times a  day. as needed for muscle spasm 20 Tab 1   • DULoxetine (CYMBALTA) 20 MG Cap DR Particles Take 20 mg by mouth every day.     • lidocaine (LIDODERM) 5 % Patch Apply 1 Patch to skin as directed every 24 hours. as needed for nerve pain 30 Patch 5   • XIFAXAN 550 MG Tab tablet      • acyclovir (ZOVIRAX) 400 MG tablet      • fluocinonide (LIDEX) 0.05 % Cream      • Levocetirizine Dihydrochloride 5 MG Tab      • diphenhydrAMINE (BENADRYL) 25 MG Tab Take 25 mg by mouth 2 times a day as needed for Sleep.     • fluticasone (FLONASE) 50 MCG/ACT nasal spray Spray 1 Spray in nose every day.     • EVAMIST 1.53 MG/SPRAY SOLN Apply 1 Spray to affected area(s) every day.       No facility-administered encounter medications on file as of 5/15/2019.        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    • Hobby Hazards No   • Sleep Concern Yes     does not sleep well at times    • Stress Concern Yes     yes stressed    • Weight Concern No   • Special Diet No   • Back Care No   • Exercise No     not able to walk because of foot pain    • Bike Helmet No     does not bike    • Seat Belt Yes   • Self-Exams Yes     Social History Narrative   • No narrative on file       Vital signs: Reviewed  Constitutional: Awake, alert, no acute distress  HEENT: Normocephalic atraumatic, neck supple, no JVD noted,  no meningeal signs noted  Lymphadenopathy: no cervical, supraclavicular, or inguinal lymphadenopathy noted  Cardiovascular: Intact distal pulses, including at wrists and ankles, no limb swelling noted  Pulmonary: No  tachypnea noted, no accessory muscle use noted, no dyspnea noted  Abdominal: Soft, nontender, exhibits no distension, no peritoneal signs, no HSM  Musculoskeletal:   Cervical: Tender with palpation cervical region, decreased range of motion, pain with testing, Spurling's testing produces axial pain and left shoulder area pain on the left  Shoulder: Mild tenderness with palpation about left shoulder, negative impingement, mild pain with range of motion testing  Thoracolumbar: Only mild tenderness, only mild pain with range of motion testing, negative straight leg test  Wrist/hand: No significant pain tenderness.  No significant pain with range of motion testing. Tinel's at wrist  Elbow: No significant tenderness.  No significant pain with range of motion testing. Tinel's at elbows  Neurological: oriented. Cranial nerves grossly intact, strength non-focal.  Normal tone.  Sensation intact distally. Reflexes 1+ in upper and lower limbs, Gait steady, reciprocal.  No upper motor neuron signs evident  Skin: Skin is intact. no rashes or lesions noted  Psychiatric: normal mood and affect. speech is normal and behavior is normal.         ASSESSMENT:    1.  Neck pain, myofascial pain, cervical radiculitis, disc protrusion, degenerative disc disease, foraminal stenosis, listhesis, vertebral anomaly at C1 with posterior defect    - Initiate physical therapy, previously ordered  - Reviewed injection therapy with cervical epidural steroid injection/selective nerve root block at the left C6-7 foramen, if not responding to more conservative care    2.  Left shoulder pain, sprain strain    - Initiate physical therapy, previously ordered    3.  Mid back pain, disc protrusion, degenerative disc disease, spondylosis, facet syndrome, relatively symptomatically controlled     4.  Lumbosacral pain, disc protrusion, degenerative disc disease, lumbar spondylosis, facet syndrome, consider sacroiliac joint disorder, relatively symptomatically  controlled    5.  Hip pain, sprain strain, symptomatically controlled    6.  Joint/musculoskeletal pain    - Note, foot/ankle pain, with care per podiatry    7.  Insomnia    - Review sleep hygiene    8.  Comorbid medical issues, including IBS symptoms, apparent kidney cyst, with care per primary care provider and consultants, including GI and urology    - I would be interested in reviewing further records, requested      DISCUSSION/PLAN:    - I discussed management options. I reviewed symptomatic care    - I reviewed home exercise program, with medical precautions.  I reviewed activity modification.     - The patient can consider complementary trials with acupuncture, superficial massage therapy, or TENS unit    - I reviewed medication monitoring.  For now, continue current medications. I reviewed medication adjustments.    - I reviewed risks, side effects, and interactions of medications, including over-the-counter medications.  I reviewed further symptomatic medications.    - I reviewed additional diagnostic options, including further/advanced imaging, electrodiagnostic testing, vascular studies, and further lab screen    - I reviewed additional therapeutic options, including further injection/interventional therapy and additional consultative input    - Return after the physical therapy treatment trial or sooner if needed      Please note that this dictation was created using voice recognition software. I have made every reasonable attempt to correct obvious errors but there may be errors of grammar and content that I may have overlooked prior to finalization of this note.

## 2019-05-16 ENCOUNTER — HOSPITAL ENCOUNTER (OUTPATIENT)
Dept: RADIOLOGY | Facility: MEDICAL CENTER | Age: 54
End: 2019-05-16

## 2019-05-20 ENCOUNTER — TELEPHONE (OUTPATIENT)
Dept: PHYSICAL MEDICINE AND REHAB | Facility: MEDICAL CENTER | Age: 54
End: 2019-05-20

## 2019-05-20 NOTE — TELEPHONE ENCOUNTER
"Daylin called and said she is frustrated with [facility] because she was supposed to be referred to a urologist urgently and they have seemed to \"drop the ball\". She asked if Dr. High could refer her to a urologist. I let her know since [facility] has all the documentation needed for insurance/referral it is best to check back with them. I let her know to ask to speak with the office mgr and politely see what is happening with referral and see he/she could help expedite the referral due to her concerns about mass on CT of abdomen.   "

## 2020-02-13 ENCOUNTER — OFFICE VISIT (OUTPATIENT)
Dept: URGENT CARE | Facility: PHYSICIAN GROUP | Age: 55
End: 2020-02-13
Payer: COMMERCIAL

## 2020-02-13 VITALS
SYSTOLIC BLOOD PRESSURE: 130 MMHG | RESPIRATION RATE: 16 BRPM | WEIGHT: 127.8 LBS | TEMPERATURE: 98.7 F | HEART RATE: 77 BPM | HEIGHT: 64 IN | DIASTOLIC BLOOD PRESSURE: 72 MMHG | OXYGEN SATURATION: 97 % | BODY MASS INDEX: 21.82 KG/M2

## 2020-02-13 DIAGNOSIS — J01.90 ACUTE BACTERIAL SINUSITIS: ICD-10-CM

## 2020-02-13 DIAGNOSIS — B96.89 ACUTE BACTERIAL SINUSITIS: ICD-10-CM

## 2020-02-13 PROCEDURE — 99203 OFFICE O/P NEW LOW 30 MIN: CPT | Performed by: NURSE PRACTITIONER

## 2020-02-13 RX ORDER — FLUCONAZOLE 150 MG/1
TABLET ORAL
COMMUNITY
Start: 2019-12-20 | End: 2020-02-13

## 2020-02-13 RX ORDER — CEFUROXIME AXETIL 500 MG/1
250 TABLET ORAL 2 TIMES DAILY
Qty: 7 TAB | Refills: 0 | Status: SHIPPED | OUTPATIENT
Start: 2020-02-13 | End: 2020-02-20

## 2020-02-13 ASSESSMENT — ENCOUNTER SYMPTOMS
COUGH: 1
VOMITING: 0
HEARTBURN: 0
SINUS PAIN: 1
CHILLS: 1
FEVER: 1
ABDOMINAL PAIN: 0
NAUSEA: 0

## 2020-02-13 NOTE — PROGRESS NOTES
Subjective:      Daylin Taylor is a 54 y.o. female who presents with Cough (sinus pain/pressure, body aches, fatigue, chest burning, difficulty breathing, x1 week )            Sinusitis   This is a new problem. Episode onset: 7 days. The problem has been gradually worsening since onset. Maximum temperature: subjective. The fever has been present for 3 to 4 days. Her pain is at a severity of 5/10. The pain is moderate. Associated symptoms include chills, congestion and coughing. (She reports urgent care for new problem.  States she has had worsening sinus pain, congestion, severe body aches, fatigue, chest congestion, chest burning, difficulty breathing for 7 days.  Is taking over-the-counter NyQuil and ibuprofen with mild relief.  Is wondering if she has both a sinus infection and influenza.  Body aches, chills and fever started 4 days ago.)       Review of Systems   Constitutional: Positive for chills, fever and malaise/fatigue.   HENT: Positive for congestion and sinus pain.    Respiratory: Positive for cough.    Gastrointestinal: Negative for abdominal pain, heartburn, nausea and vomiting.     Past Medical History:   Diagnosis Date   • Anxiety    • Asthma     once with an allergic reaction   • Back pain    • Depression    • Headache    • Hypertension     with pain   • IBS (irritable bowel syndrome)     [facility]   • Skin lesions       Past Surgical History:   Procedure Laterality Date   • NERVE REPAIR Right 8/17/2016    Procedure: NERVE REPAIR RADIAL DIGITAL SMALL FINGER;  Surgeon: Simone Mendoza M.D.;  Location: SURGERY [facility];  Service:    • CARPAL TUNNEL RELEASE      left   • CARPAL TUNNEL RELEASE      right   • KNEE RECONSTRUCTION      Right ACL      Social History     Socioeconomic History   • Marital status:      Spouse name: Not on file   • Number of children: Not on file   • Years of education: Not on file   • Highest education level: Not on file   Occupational  "History   • Not on file   Social Needs   • Financial resource strain: Not on file   • Food insecurity     Worry: Not on file     Inability: Not on file   • Transportation needs     Medical: Not on file     Non-medical: Not on file   Tobacco Use   • Smoking status: Never Smoker   • Smokeless tobacco: Never Used   Substance and Sexual Activity   • Alcohol use: No   • Drug use: No   • Sexual activity: Yes     Partners: Male   Lifestyle   • Physical activity     Days per week: Not on file     Minutes per session: Not on file   • Stress: Not on file   Relationships   • Social connections     Talks on phone: Not on file     Gets together: Not on file     Attends Tenriism service: Not on file     Active member of club or organization: Not on file     Attends meetings of clubs or organizations: Not on file     Relationship status: Not on file   • Intimate partner violence     Fear of current or ex partner: Not on file     Emotionally abused: Not on file     Physically abused: Not on file     Forced sexual activity: Not on file   Other Topics Concern   •  Service No   • Blood Transfusions No   • Caffeine Concern No   • 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    • Hobby Hazards No   • Sleep Concern Yes     Comment: does not sleep well at times    • Stress Concern Yes     Comment: yes stressed    • Weight Concern No   • Special Diet No   • Back Care No   • Exercise No     Comment: not able to walk because of foot pain    • Bike Helmet No     Comment: does not bike    • Seat Belt Yes   • Self-Exams Yes   Social History Narrative   • Not on file    Allergies: Codeine         Objective:     /72 (BP Location: Right arm, Patient Position: Sitting, BP Cuff Size: Adult)   Pulse 77   Temp 37.1 °C (98.7 °F) (Temporal)   Resp 16   Ht 1.626 m (5' 4\")   Wt 58 kg (127 lb 12.8 oz)   LMP 01/27/2015   SpO2 97%   BMI 21.94 kg/m²      Physical Exam  Vitals signs reviewed.   Constitutional:       Appearance: Normal " appearance.   HENT:      Right Ear: Tympanic membrane, ear canal and external ear normal.      Left Ear: Tympanic membrane, ear canal and external ear normal.      Nose:      Right Turbinates: Enlarged and swollen.      Left Turbinates: Enlarged and swollen.      Right Sinus: Maxillary sinus tenderness and frontal sinus tenderness present.      Left Sinus: Maxillary sinus tenderness and frontal sinus tenderness present.      Mouth/Throat:      Lips: Pink.      Mouth: Mucous membranes are moist.      Pharynx: Uvula midline.   Cardiovascular:      Rate and Rhythm: Normal rate and regular rhythm.      Heart sounds: Normal heart sounds.   Pulmonary:      Effort: Pulmonary effort is normal.      Breath sounds: Normal breath sounds.   Lymphadenopathy:      Cervical: Cervical adenopathy present.   Neurological:      Mental Status: She is alert and oriented to person, place, and time.   Psychiatric:         Mood and Affect: Mood normal.         Behavior: Behavior normal.         Thought Content: Thought content normal.         Judgment: Judgment normal.                 Assessment/Plan:       1. Acute bacterial sinusitis  - cefUROXime (CEFTIN) 500 MG Tab; Take 0.5 Tabs by mouth 2 times a day for 7 days.  Dispense: 7 Tab; Refill: 0    Discussed physical examination findings as well as length of patient symptoms are likely due to acute bacterial sinusitis as well as possible influenza infection.  Unfortunately patient is outside the 72-hour window for treatment for influenza so opted not to treat her today.  Discussed supportive care for treatment of influenza includes over-the-counter NSAIDs and Tylenol per 's instructions and increase fluids using electrolyte enriched beverages.  Will treat acute bacterial sinusitis with Ceftin per patient request as she states that she has had these before and it works better than Augmentin.  Patient may continue to use NyQuil at night to assist with her sleeping and discussed  the use of Afrin to assist with sinusitis symptoms.    Supportive care, differential diagnoses, and indications for immediate follow-up discussed with patient.    Pathogenesis of diagnosis discussed including typical length and natural progression. Patient expresses understanding and agrees to plan.    Instructed patient to return to clinic for worsening symptoms or symptoms that persist for 7 to 10 days     Please note that this dictation was created using voice recognition software. I have made every reasonable attempt to correct obvious errors, but I expect that there are errors of grammar and possibly content that I did not discover before finalizing the note.

## 2020-11-03 ENCOUNTER — HOSPITAL ENCOUNTER (OUTPATIENT)
Dept: LAB | Facility: MEDICAL CENTER | Age: 55
End: 2020-11-03
Attending: OTOLARYNGOLOGY
Payer: COMMERCIAL

## 2020-11-03 PROCEDURE — U0003 INFECTIOUS AGENT DETECTION BY NUCLEIC ACID (DNA OR RNA); SEVERE ACUTE RESPIRATORY SYNDROME CORONAVIRUS 2 (SARS-COV-2) (CORONAVIRUS DISEASE [COVID-19]), AMPLIFIED PROBE TECHNIQUE, MAKING USE OF HIGH THROUGHPUT TECHNOLOGIES AS DESCRIBED BY CMS-2020-01-R: HCPCS

## 2020-11-03 PROCEDURE — C9803 HOPD COVID-19 SPEC COLLECT: HCPCS

## 2020-11-04 LAB
COVID ORDER STATUS COVID19: NORMAL
SARS-COV-2 RNA RESP QL NAA+PROBE: NOTDETECTED
SPECIMEN SOURCE: NORMAL

## 2021-03-18 ENCOUNTER — HOSPITAL ENCOUNTER (OUTPATIENT)
Dept: LAB | Facility: MEDICAL CENTER | Age: 56
End: 2021-03-18
Attending: INTERNAL MEDICINE
Payer: COMMERCIAL

## 2021-03-18 ENCOUNTER — HOSPITAL ENCOUNTER (OUTPATIENT)
Dept: LAB | Facility: MEDICAL CENTER | Age: 56
End: 2021-03-18
Attending: UROLOGY
Payer: COMMERCIAL

## 2021-03-18 LAB
ALBUMIN SERPL BCP-MCNC: 4.6 G/DL (ref 3.2–4.9)
ALBUMIN/GLOB SERPL: 1.5 G/DL
ALP SERPL-CCNC: 96 U/L (ref 30–99)
ALT SERPL-CCNC: 19 U/L (ref 2–50)
ANION GAP SERPL CALC-SCNC: 11 MMOL/L (ref 7–16)
AST SERPL-CCNC: 26 U/L (ref 12–45)
BILIRUB SERPL-MCNC: 0.3 MG/DL (ref 0.1–1.5)
BUN SERPL-MCNC: 15 MG/DL (ref 8–22)
CALCIUM SERPL-MCNC: 9.7 MG/DL (ref 8.5–10.5)
CHLORIDE SERPL-SCNC: 99 MMOL/L (ref 96–112)
CO2 SERPL-SCNC: 26 MMOL/L (ref 20–33)
CREAT SERPL-MCNC: 0.88 MG/DL (ref 0.5–1.4)
ERYTHROCYTE [DISTWIDTH] IN BLOOD BY AUTOMATED COUNT: 44.1 FL (ref 35.9–50)
FASTING STATUS PATIENT QL REPORTED: NORMAL
GLOBULIN SER CALC-MCNC: 3 G/DL (ref 1.9–3.5)
GLUCOSE SERPL-MCNC: 96 MG/DL (ref 65–99)
HCT VFR BLD AUTO: 40.9 % (ref 37–47)
HGB BLD-MCNC: 13.6 G/DL (ref 12–16)
MCH RBC QN AUTO: 30.8 PG (ref 27–33)
MCHC RBC AUTO-ENTMCNC: 33.3 G/DL (ref 33.6–35)
MCV RBC AUTO: 92.5 FL (ref 81.4–97.8)
PLATELET # BLD AUTO: 377 K/UL (ref 164–446)
PMV BLD AUTO: 9.9 FL (ref 9–12.9)
POTASSIUM SERPL-SCNC: 3.9 MMOL/L (ref 3.6–5.5)
PROT SERPL-MCNC: 7.6 G/DL (ref 6–8.2)
RBC # BLD AUTO: 4.42 M/UL (ref 4.2–5.4)
SODIUM SERPL-SCNC: 136 MMOL/L (ref 135–145)
T4 FREE SERPL-MCNC: 1.13 NG/DL (ref 0.93–1.7)
TSH SERPL DL<=0.005 MIU/L-ACNC: 1.89 UIU/ML (ref 0.38–5.33)
WBC # BLD AUTO: 7.5 K/UL (ref 4.8–10.8)

## 2021-03-18 PROCEDURE — 80053 COMPREHEN METABOLIC PANEL: CPT

## 2021-03-18 PROCEDURE — 84443 ASSAY THYROID STIM HORMONE: CPT

## 2021-03-18 PROCEDURE — 36415 COLL VENOUS BLD VENIPUNCTURE: CPT

## 2021-03-18 PROCEDURE — 85027 COMPLETE CBC AUTOMATED: CPT

## 2021-03-18 PROCEDURE — 84439 ASSAY OF FREE THYROXINE: CPT

## 2021-04-24 ENCOUNTER — APPOINTMENT (OUTPATIENT)
Dept: RADIOLOGY | Facility: MEDICAL CENTER | Age: 56
End: 2021-04-24
Attending: EMERGENCY MEDICINE
Payer: COMMERCIAL

## 2021-04-24 ENCOUNTER — HOSPITAL ENCOUNTER (EMERGENCY)
Facility: MEDICAL CENTER | Age: 56
End: 2021-04-24
Attending: EMERGENCY MEDICINE
Payer: COMMERCIAL

## 2021-04-24 VITALS
TEMPERATURE: 97.2 F | RESPIRATION RATE: 16 BRPM | HEIGHT: 64 IN | BODY MASS INDEX: 23.9 KG/M2 | OXYGEN SATURATION: 97 % | SYSTOLIC BLOOD PRESSURE: 151 MMHG | WEIGHT: 140 LBS | DIASTOLIC BLOOD PRESSURE: 101 MMHG | HEART RATE: 77 BPM

## 2021-04-24 DIAGNOSIS — K59.00 CONSTIPATION, UNSPECIFIED CONSTIPATION TYPE: ICD-10-CM

## 2021-04-24 DIAGNOSIS — R10.9 ABDOMINAL PAIN, UNSPECIFIED ABDOMINAL LOCATION: ICD-10-CM

## 2021-04-24 LAB
ALBUMIN SERPL BCP-MCNC: 4.1 G/DL (ref 3.2–4.9)
ALBUMIN/GLOB SERPL: 1.5 G/DL
ALP SERPL-CCNC: 78 U/L (ref 30–99)
ALT SERPL-CCNC: 10 U/L (ref 2–50)
ANION GAP SERPL CALC-SCNC: 9 MMOL/L (ref 7–16)
APPEARANCE UR: CLEAR
AST SERPL-CCNC: 14 U/L (ref 12–45)
BASOPHILS # BLD AUTO: 0.3 % (ref 0–1.8)
BASOPHILS # BLD: 0.03 K/UL (ref 0–0.12)
BILIRUB SERPL-MCNC: 0.3 MG/DL (ref 0.1–1.5)
BILIRUB UR QL STRIP.AUTO: NEGATIVE
BUN SERPL-MCNC: 9 MG/DL (ref 8–22)
CALCIUM SERPL-MCNC: 8.8 MG/DL (ref 8.5–10.5)
CHLORIDE SERPL-SCNC: 102 MMOL/L (ref 96–112)
CO2 SERPL-SCNC: 21 MMOL/L (ref 20–33)
COLOR UR: YELLOW
CREAT SERPL-MCNC: 0.87 MG/DL (ref 0.5–1.4)
EOSINOPHIL # BLD AUTO: 0.21 K/UL (ref 0–0.51)
EOSINOPHIL NFR BLD: 2.4 % (ref 0–6.9)
ERYTHROCYTE [DISTWIDTH] IN BLOOD BY AUTOMATED COUNT: 46.3 FL (ref 35.9–50)
GLOBULIN SER CALC-MCNC: 2.7 G/DL (ref 1.9–3.5)
GLUCOSE SERPL-MCNC: 100 MG/DL (ref 65–99)
GLUCOSE UR STRIP.AUTO-MCNC: NEGATIVE MG/DL
HCT VFR BLD AUTO: 42.7 % (ref 37–47)
HGB BLD-MCNC: 14.2 G/DL (ref 12–16)
IMM GRANULOCYTES # BLD AUTO: 0.03 K/UL (ref 0–0.11)
IMM GRANULOCYTES NFR BLD AUTO: 0.3 % (ref 0–0.9)
KETONES UR STRIP.AUTO-MCNC: NEGATIVE MG/DL
LEUKOCYTE ESTERASE UR QL STRIP.AUTO: NEGATIVE
LIPASE SERPL-CCNC: 26 U/L (ref 11–82)
LYMPHOCYTES # BLD AUTO: 1.45 K/UL (ref 1–4.8)
LYMPHOCYTES NFR BLD: 16.3 % (ref 22–41)
MCH RBC QN AUTO: 30.5 PG (ref 27–33)
MCHC RBC AUTO-ENTMCNC: 33.3 G/DL (ref 33.6–35)
MCV RBC AUTO: 91.6 FL (ref 81.4–97.8)
MICRO URNS: NORMAL
MONOCYTES # BLD AUTO: 0.77 K/UL (ref 0–0.85)
MONOCYTES NFR BLD AUTO: 8.6 % (ref 0–13.4)
NEUTROPHILS # BLD AUTO: 6.43 K/UL (ref 2–7.15)
NEUTROPHILS NFR BLD: 72.1 % (ref 44–72)
NITRITE UR QL STRIP.AUTO: NEGATIVE
NRBC # BLD AUTO: 0 K/UL
NRBC BLD-RTO: 0 /100 WBC
PH UR STRIP.AUTO: 6.5 [PH] (ref 5–8)
PLATELET # BLD AUTO: 388 K/UL (ref 164–446)
PMV BLD AUTO: 9.4 FL (ref 9–12.9)
POTASSIUM SERPL-SCNC: 3.6 MMOL/L (ref 3.6–5.5)
PROT SERPL-MCNC: 6.8 G/DL (ref 6–8.2)
PROT UR QL STRIP: NEGATIVE MG/DL
RBC # BLD AUTO: 4.66 M/UL (ref 4.2–5.4)
RBC UR QL AUTO: NEGATIVE
SODIUM SERPL-SCNC: 132 MMOL/L (ref 135–145)
SP GR UR STRIP.AUTO: <=1.005
UROBILINOGEN UR STRIP.AUTO-MCNC: 0.2 MG/DL
WBC # BLD AUTO: 8.9 K/UL (ref 4.8–10.8)

## 2021-04-24 PROCEDURE — 700101 HCHG RX REV CODE 250: Performed by: EMERGENCY MEDICINE

## 2021-04-24 PROCEDURE — 83690 ASSAY OF LIPASE: CPT

## 2021-04-24 PROCEDURE — 700102 HCHG RX REV CODE 250 W/ 637 OVERRIDE(OP): Performed by: EMERGENCY MEDICINE

## 2021-04-24 PROCEDURE — 96375 TX/PRO/DX INJ NEW DRUG ADDON: CPT

## 2021-04-24 PROCEDURE — 94760 N-INVAS EAR/PLS OXIMETRY 1: CPT

## 2021-04-24 PROCEDURE — 96374 THER/PROPH/DIAG INJ IV PUSH: CPT

## 2021-04-24 PROCEDURE — 700117 HCHG RX CONTRAST REV CODE 255: Performed by: EMERGENCY MEDICINE

## 2021-04-24 PROCEDURE — 51701 INSERT BLADDER CATHETER: CPT

## 2021-04-24 PROCEDURE — 80053 COMPREHEN METABOLIC PANEL: CPT

## 2021-04-24 PROCEDURE — 81003 URINALYSIS AUTO W/O SCOPE: CPT

## 2021-04-24 PROCEDURE — A9270 NON-COVERED ITEM OR SERVICE: HCPCS | Performed by: EMERGENCY MEDICINE

## 2021-04-24 PROCEDURE — 99285 EMERGENCY DEPT VISIT HI MDM: CPT

## 2021-04-24 PROCEDURE — 700111 HCHG RX REV CODE 636 W/ 250 OVERRIDE (IP): Performed by: EMERGENCY MEDICINE

## 2021-04-24 PROCEDURE — 700112 HCHG RX REV CODE 229: Performed by: EMERGENCY MEDICINE

## 2021-04-24 PROCEDURE — 74018 RADEX ABDOMEN 1 VIEW: CPT

## 2021-04-24 PROCEDURE — 74177 CT ABD & PELVIS W/CONTRAST: CPT

## 2021-04-24 PROCEDURE — 85025 COMPLETE CBC W/AUTO DIFF WBC: CPT

## 2021-04-24 RX ORDER — ONDANSETRON 2 MG/ML
4 INJECTION INTRAMUSCULAR; INTRAVENOUS ONCE
Status: COMPLETED | OUTPATIENT
Start: 2021-04-24 | End: 2021-04-24

## 2021-04-24 RX ORDER — HYDROMORPHONE HYDROCHLORIDE 1 MG/ML
0.5 INJECTION, SOLUTION INTRAMUSCULAR; INTRAVENOUS; SUBCUTANEOUS ONCE
Status: COMPLETED | OUTPATIENT
Start: 2021-04-24 | End: 2021-04-24

## 2021-04-24 RX ORDER — DOCUSATE SODIUM 100 MG/1
100 CAPSULE, LIQUID FILLED ORAL 2 TIMES DAILY
Qty: 30 CAPSULE | Refills: 0 | Status: SHIPPED | OUTPATIENT
Start: 2021-04-24

## 2021-04-24 RX ORDER — ALUMINA, MAGNESIA, AND SIMETHICONE 2400; 2400; 240 MG/30ML; MG/30ML; MG/30ML
10 SUSPENSION ORAL ONCE
Status: COMPLETED | OUTPATIENT
Start: 2021-04-24 | End: 2021-04-24

## 2021-04-24 RX ADMIN — MINERAL OIL 1 EACH: 1000 LIQUID ORAL at 07:20

## 2021-04-24 RX ADMIN — HYDROMORPHONE HYDROCHLORIDE 0.5 MG: 1 INJECTION, SOLUTION INTRAMUSCULAR; INTRAVENOUS; SUBCUTANEOUS at 10:33

## 2021-04-24 RX ADMIN — ONDANSETRON 4 MG: 2 INJECTION INTRAMUSCULAR; INTRAVENOUS at 10:33

## 2021-04-24 RX ADMIN — IOHEXOL 100 ML: 350 INJECTION, SOLUTION INTRAVENOUS at 09:23

## 2021-04-24 RX ADMIN — ALUMINUM HYDROXIDE, MAGNESIUM HYDROXIDE, AND DIMETHICONE 10 ML: 400; 400; 40 SUSPENSION ORAL at 07:39

## 2021-04-24 ASSESSMENT — ENCOUNTER SYMPTOMS
FEVER: 0
COUGH: 0

## 2021-04-24 ASSESSMENT — PAIN DESCRIPTION - PAIN TYPE: TYPE: ACUTE PAIN

## 2021-04-24 ASSESSMENT — FIBROSIS 4 INDEX: FIB4 SCORE: 0.85

## 2021-04-24 NOTE — ED NOTES
"Pt states \"small amount of diarrhea\" episode in bathroom, along with flatulence, states very minimal. Will continue with enema order once sent from pharmacy.   "

## 2021-04-24 NOTE — ED PROVIDER NOTES
"ED Provider Note     4/24/2021  5:13 AM    Means of Arrival: Walk In  History obtained by: patient  Limitations: none  PCP: Daphnie Faustin  CODE STATUS: Full    CHIEF COMPLAINT  Chief Complaint   Patient presents with   • Abdominal Pain     x5 days, worse to LLQ, saw [facility], dx w/ bowel infection, has taken 3 doses of augmentin.       ESPERANZA Taylor is a 55 y.o. female history of chronic back pain, hypertension, IBS, \"familial fructose intolerance,\" now presenting with concerns of generalized abdominal pain and cramping.  She says that the symptoms started gradually over this past week.  Her last regular bowel movement was 7 days ago.  Since then she has had either small amount of liquid or very small piece of hard stool released.  Pain comes in waves, described as a cramp.  No vomiting.  Over the past 2 days she has tried to laxatives, 2 suppositories, 2 enemas at home and minimal relief.    [facility].  Through [facility] she was diagnosed with the fructose intolerance condition.  She has had prior small bowel infections treated with Xifaxan.  Prescribed Xifaxan by GI for \"small bowel infection\" in February but she has not taken it because she could not afford medication.  She has not had any fevers.  Abdominal discomforts were well controlled until this past week.  She also states that she has had a thoracic back injury.  She was told in the past that symptoms from the back injury could cause abdominal pain.  She has not had any weakness in her legs.  No difficulty with ambulation.  She follows up in the [place] [facility] for her back pain.  She also has a history of a kidney mass resection that was performed laparoscopically.    REVIEW OF SYSTEMS  Review of Systems   Constitutional: Negative for fever and malaise/fatigue.   Respiratory: Negative for cough.    Cardiovascular: Negative for chest pain.   All other systems reviewed and are " "negative.    See HPI for further details.    PAST MEDICAL HISTORY   has a past medical history of Anxiety, Asthma, Back pain, Depression, Headache, Hypertension, IBS (irritable bowel syndrome), and Skin lesions.    SOCIAL HISTORY  Social History     Tobacco Use   • Smoking status: Never Smoker   • Smokeless tobacco: Never Used   Substance and Sexual Activity   • Alcohol use: No   • Drug use: No   • Sexual activity: Yes     Partners: Male       SURGICAL HISTORY   has a past surgical history that includes knee reconstruction; carpal tunnel release; carpal tunnel release; and nerve repair (Right, 8/17/2016).    CURRENT MEDICATIONS  Home Medications     Reviewed by Chastity Kahn R.N. (Registered Nurse) on 04/24/21 at 0514  Med List Status: Partial   Medication Last Dose Status   amlodipine-benazepril (LOTREL) 5-20 MG per capsule  Active   DULoxetine (CYMBALTA) 20 MG Cap DR Particles  Active   Estradiol (ESTROGEL) 0.75 MG/1.25 GM (0.06%) Gel  Active   fluticasone (FLONASE) 50 MCG/ACT nasal spray  Active   gabapentin (NEURONTIN) 300 MG Cap  Active   progesterone (PROMETRIUM) 100 MG Cap  Active   Pseudoeph-Doxylamine-DM-APAP (NYQUIL PO)  Active                ALLERGIES  Allergies   Allergen Reactions   • Codeine Itching and Vomiting     RXN=20 years go       PHYSICAL EXAM  VITAL SIGNS: /92   Pulse 75   Temp 36.4 °C (97.5 °F) (Temporal)   Resp 18   Ht 1.626 m (5' 4\")   Wt 63.5 kg (140 lb)   LMP 01/27/2015   SpO2 95%   BMI 24.03 kg/m²     Pulse ox interpretation: I interpret this pulse ox as normal.  Constitutional: Alert in no apparent distress.  Very pleasant and interactive 55-year-old woman.  HENT: No signs of trauma, Bilateral external ears normal, Nose normal.   Eyes: Pupils are equal, Conjunctiva normal, Non-icteric.   Neck: Normal range of motion, No tenderness, Supple, No stridor.   Cardiovascular: Regular rate and rhythm, no murmurs. Symmetric distal pulses. No cyanosis of extremities. No peripheral " "edema of extremities.  Thorax & Lungs: Normal breath sounds, No respiratory distress, No wheezing, No chest tenderness.   Abdomen: Normal bowel sounds.  Abdomen is soft, slightly distended.  No focal guarding or peritoneal signs.  There is some generalized tenderness.  There are well-healed old laparoscopic incisional wounds.  Skin: Warm, Dry, No erythema, No rash.   Back: No midline bony tenderness, No CVA tenderness.   Musculoskeletal: Good range of motion in all major joints. No tenderness to palpation or major deformities noted.   Neurologic: Alert , Normal motor function, Normal sensory function, No focal deficits noted.   Psychiatric: Affect normal, Judgment normal, Mood normal.   Physical Exam      DIAGNOSTIC STUDIES / PROCEDURES      LABS  Pertinent Labs & Imaging studies reviewed. (See chart for details)    RADIOLOGY  Pertinent Labs & Imaging studies reviewed. (See chart for details)    COURSE & MEDICAL DECISION MAKING  Pertinent Labs & Imaging studies reviewed. (See chart for details)    5:13 AM This is an emergent evaluation of a  55 y.o. female who presents with concerns of generalized abdominal discomfort and constipation worsening over the past week.  Vital signs are reassuring and normal.  Abdominal exam without peritoneal findings.  There is some generalized discomfort and distention on palpation.  X-ray will be done to look for signs of constipation for small bowel obstruction.  She says she is still infrequently having flatus.  She is not having vomiting.  We will also screen for leukocytosis, anemia, metabolic abnormalities, pancreatitis.  Urinalysis ordered but I have a low suspicion for urinary tract infection.    7:00 AM  CBC and CMP within acceptable limits.  Lipase normal.  No urinalysis provided.  Abdominal x-ray shows increase colonic stool.  No signs of obstruction.  I recommended a strong enema, \"pink lady cocktail.\"  She is agreeable to this.  She did try to have bowel movement without " "intervention here but was unable to.  She says she has the sensation to have a bowel movement but \" nothing is happening.\"  Is now the end of my shift and we are still waiting for enema administration.  My colleague, Dr. Key, has kindly agreed to follow-up in results of enema and disposition accordingly.  I do anticipate that she will be able to go home this morning.      FINAL IMPRESSION    ICD-10-CM   1. Constipation, unspecified constipation type  K59.00   2. Abdominal pain, unspecified abdominal location  R10.9            This dictation was created using voice recognition software. The accuracy of the dictation is limited to the abilities of the software. I expect there may be some errors of grammar and possibly content. The nursing notes were reviewed and certain aspects of this information were incorporated into this note.    Electronically signed by: Aren Chen II, M.D., 4/24/2021 5:13 AM      "

## 2021-04-24 NOTE — DISCHARGE INSTRUCTIONS
Follow-up with GI Monday.  Return for new or concerning symptoms in the interim.  You are being written for medications for constipation.

## 2021-04-24 NOTE — ED TRIAGE NOTES
"Chief Complaint   Patient presents with   • Abdominal Pain     x5 days, worse to LLQ, saw [facility], dx w/ bowel infection, has taken 3 doses of augmentin.       PT BIB [facility] for above complaint. States has hx of fructose intolerance and bowel infections. Reports pain worsening the last few days, last BM on Monday, intermittent nausea & difficulty urinating.   Given 100 fent PTA, decreased pain to 4/10.    /68   Pulse 77   Temp 36.4 °C (97.5 °F) (Temporal)   Ht 1.626 m (5' 4\")   Wt 63.5 kg (140 lb)   LMP 01/27/2015   SpO2 97%   BMI 24.03 kg/m²     "

## 2021-04-24 NOTE — ED NOTES
Given discharge instructions, verbalized understanding, left with all belongings, ambulates to ED lobby. Pt informed to not operate any motorized vehicles while taking any opiates. Pt to be driven home by friend.

## 2021-04-24 NOTE — ED PROVIDER NOTES
ED Provider Addendum Note     Scribed for Harsha Key M.D. by Jorge Berrios. 4/24/2021, 7:02 AM.    This is an addendum to the note on this patient.  For further details and full chart information, see the previously signed note.      Radiology  CT-ABDOMEN-PELVIS WITH   Final Result      Scarring in the midpole of the right kidney with a residual 1.4 cm hypodensity which could be related to posttreatment changes or a residual cystic lesion. Further evaluation can be performed with renal protocol MRI.      Moderate amount of colonic stool, particularly in the sigmoid colon. Colonic diverticulosis.      No evidence of bowel obstruction. Decompressed distal rectosigmoid colon. Sigmoidoscopy could be considered to exclude a subtle colonic mass or stricture.      Mild atherosclerotic plaque.         [id number] VIEW   Final Result      1.  No evidence of bowel obstruction.   2.  Mildly increased colonic stool.          Course    7:03 AM - The patient is currently in the process of having an enema administered. I will re-evaluate following.     8:34 AM - Upon evaluation, she is laying on her left side holding her abdomen. She reports right sided abdominal pain at this time. Her GI doctor is Dr. Esha Chavez. She was seen [facility] and diagnosed with a small bowel infection which has been treated with antibiotics and fructose intolerance. She does not have a follow up appointment with [facility]. surgery on ovarian cyst .She denies appendectomy or cholecystectomy. Her abdominal exam is remarkable for right lower quadrant tenderness without rebound or guarding. I will order CT Abdomen for evaluation.     9:50 AM - Review of radiology shows right kidney with 1.4 cm suspected cystic lesion.     10:15 AM - Upon repeat evaluation, the patient is resting in bed with stable vitals. I updated her on the radiology results. She is understanding of results.    10:38 AM - Upon repeat evaluation the patient is resting in  bed with stable vitals. I recommended follow up with [facility] as soon as possible and I informed the patient of my plan for discharge, I provided precautions to return for any new or worsening symptoms. The patient verbalized understanding of discharge precautions and is agreeable to my plan.        Medical Decision Making    This is a 55-year-old female that was checked out to me to follow-up on results of an enema.  The enema produced very scant stool.  Subsequent CT was ordered given tenderness towards the right lower abdomen.  There is no evidence of acute pathology noted on CT today.  The patient has no evidence of obstruction, free air, diverticulitis, colitis or other pathology.  She does have a hypodensity in the right kidney which she is aware of.  She does also appear to have constipation.  She is going to [facility] with [facility] she was seen previously.     The patient will return for new or worsening symptoms and is stable at the time of discharge.    The patient is referred to a primary physician for blood pressure management, diabetic screening, and for all other preventative health concerns.      DISPOSITION:  Patient will be discharged home in stable condition.    FOLLOW UP:  [facility] - [address] [address] [phone]  Schedule an appointment as soon as possible for a visit       [facility], Emergency Dept  [address] [facility] [address] [phone]    If symptoms worsen, fever, frequent vomiting or other serious concerns      OUTPATIENT MEDICATIONS:  New Prescriptions    DOCUSATE SODIUM (COLACE) 100 MG CAP    Take 1 capsule by mouth 2 times a day.    MAGNESIUM CITRATE SOLUTION    Take 300 mL by mouth one time for 1 dose.          IJorge (Diomedes), am scribing for, and in the presence of, Harsha Key M.D..     Electronically signed by: Jorge Augustin), 4/24/2021     Harsha CHOWDHURY M.D.  personally performed the services described in this documentation, as scribed by Jorge Berrios in my presence, and it is both accurate and complete.    C.     The note accurately reflects work and decisions made by me.  Harsha Key M.D.  4/24/2021  10:44 AM

## 2021-06-18 PROBLEM — M50.30 DDD (DEGENERATIVE DISC DISEASE), CERVICAL: Status: ACTIVE | Noted: 2021-06-18

## 2021-06-18 PROBLEM — M51.34 THORACIC DEGENERATIVE DISC DISEASE: Status: ACTIVE | Noted: 2021-06-18

## 2021-06-18 PROBLEM — M70.61 GREATER TROCHANTERIC BURSITIS OF RIGHT HIP: Status: ACTIVE | Noted: 2021-06-18

## 2021-06-18 PROBLEM — M70.62 GREATER TROCHANTERIC BURSITIS OF LEFT HIP: Status: ACTIVE | Noted: 2021-06-18

## 2021-06-18 PROBLEM — M51.36 DEGENERATION OF LUMBAR INTERVERTEBRAL DISC: Status: ACTIVE | Noted: 2021-06-18

## 2021-06-18 PROBLEM — M79.10 MYALGIA: Status: ACTIVE | Noted: 2021-06-18

## 2021-06-18 PROBLEM — M54.14 THORACIC RADICULITIS: Status: ACTIVE | Noted: 2021-06-18

## 2021-06-18 PROBLEM — M54.12 CERVICAL RADICULITIS: Status: ACTIVE | Noted: 2021-06-18

## 2025-01-20 NOTE — TELEPHONE ENCOUNTER
"I spoke with Daylin and she said she is fine from procedure and \"feels wonderful\"   She is leaving next week and will not be back in for fv.   " This is a chronic condition.  The patient presently taking Xarelto.  Patient tolerating medication well denied history of bleeding.